# Patient Record
Sex: MALE | Race: WHITE | Employment: OTHER | URBAN - METROPOLITAN AREA
[De-identification: names, ages, dates, MRNs, and addresses within clinical notes are randomized per-mention and may not be internally consistent; named-entity substitution may affect disease eponyms.]

---

## 2017-01-16 DIAGNOSIS — G89.29 CHRONIC MIDLINE LOW BACK PAIN WITHOUT SCIATICA: Primary | ICD-10-CM

## 2017-01-16 DIAGNOSIS — M54.50 CHRONIC MIDLINE LOW BACK PAIN WITHOUT SCIATICA: Primary | ICD-10-CM

## 2017-01-16 RX ORDER — HYDROCODONE BITARTRATE AND ACETAMINOPHEN 5; 325 MG/1; MG/1
1 TABLET ORAL EVERY 6 HOURS PRN
Qty: 30 TABLET | Refills: 0 | Status: SHIPPED | OUTPATIENT
Start: 2017-01-16 | End: 2017-02-15 | Stop reason: ALTCHOICE

## 2017-01-16 NOTE — TELEPHONE ENCOUNTER
Norco 5-325mg      Last Written Prescription Date: 11/10/2016  Last Fill Quantity: 30,  # refills: 0   Last Office Visit with G, [facility] or [facility] prescribing provider: 11/10/2016    Kaleigh Cohen CPhT  [facility] [place]  [phone]

## 2017-01-31 DIAGNOSIS — F98.8 ADD (ATTENTION DEFICIT DISORDER): Primary | ICD-10-CM

## 2017-01-31 NOTE — TELEPHONE ENCOUNTER
adderall  Last Written Prescription Date: 11/10/16  Last Fill Quantity: 90,  # refills: 0   Last Office Visit with [facility], [facility] or [facility] prescribing provider: 11/10/16

## 2017-02-01 RX ORDER — DEXTROAMPHETAMINE SACCHARATE, AMPHETAMINE ASPARTATE, DEXTROAMPHETAMINE SULFATE AND AMPHETAMINE SULFATE 5; 5; 5; 5 MG/1; MG/1; MG/1; MG/1
20 TABLET ORAL DAILY
Qty: 90 TABLET | Refills: 0 | Status: SHIPPED | OUTPATIENT
Start: 2017-02-01 | End: 2017-04-24

## 2017-02-03 ENCOUNTER — TELEPHONE (OUTPATIENT)
Dept: FAMILY MEDICINE | Facility: CLINIC | Age: 52
End: 2017-02-03

## 2017-02-03 DIAGNOSIS — F98.8 ADD (ATTENTION DEFICIT DISORDER): Primary | ICD-10-CM

## 2017-02-03 RX ORDER — DEXTROAMPHETAMINE SACCHARATE, AMPHETAMINE ASPARTATE, DEXTROAMPHETAMINE SULFATE AND AMPHETAMINE SULFATE 5; 5; 5; 5 MG/1; MG/1; MG/1; MG/1
20 TABLET ORAL DAILY
Qty: 90 TABLET | Refills: 0 | Status: CANCELLED | OUTPATIENT
Start: 2017-02-03

## 2017-02-03 NOTE — TELEPHONE ENCOUNTER
Jordi has a current prescription on file in the pharmacy for Adderall. He last filled a 3 month supply for this 11/14/2016...   He should not need until about 2/12...   He is requesting that he can pick this up early, as he states he is going out of town.   Pharmacy will need a verbal ok given; in order for us to be able to fill this early.     Please call the pharmacy and authorize a early fill if you approve. [phone]  Thanks  Jessie Cosme [facility]   [phone]

## 2017-02-14 DIAGNOSIS — G89.29 CHRONIC MIDLINE LOW BACK PAIN WITHOUT SCIATICA: Primary | ICD-10-CM

## 2017-02-14 DIAGNOSIS — G89.29 OTHER CHRONIC PAIN: ICD-10-CM

## 2017-02-14 DIAGNOSIS — M54.50 CHRONIC MIDLINE LOW BACK PAIN WITHOUT SCIATICA: Primary | ICD-10-CM

## 2017-02-14 NOTE — TELEPHONE ENCOUNTER
Reason for Call:  Medication or medication refill:    Do you use a [facility]?  Name of the pharmacy and phone number for the current request:  [facility]- [phone]    Name of the medication requested: OxyContin     Other request: patient was on HYDROcodone-acetaminophen would like to change to oxycontin , please call patient and advise, patient is aware that Dr. Sherman is out of the office today.     Can we leave a detailed message on this number? YES    Phone number patient can be reached at: Home number on file [phone] (home)    Best Time: any    Call taken on 2/14/2017 at 10:25 AM by Kelly Fitzpatrick

## 2017-02-15 RX ORDER — OXYCODONE HYDROCHLORIDE 15 MG/1
15 TABLET, FILM COATED, EXTENDED RELEASE ORAL EVERY 12 HOURS
Qty: 60 TABLET | Refills: 0 | Status: SHIPPED | OUTPATIENT
Start: 2017-02-15 | End: 2017-03-16

## 2017-03-15 DIAGNOSIS — M54.89 MIDLINE BACK PAIN, UNSPECIFIED BACK LOCATION, UNSPECIFIED CHRONICITY: Primary | ICD-10-CM

## 2017-03-15 NOTE — TELEPHONE ENCOUNTER
Patient is requesting refill on Oxycodone instead of Oxycontin.  Doesn't want Oxycontin anymore.  Please advise and Send RX for Oxycodone. Thank you.    Violette Pedersen, Pharmacy Technician  [facility]  [phone]

## 2017-03-16 RX ORDER — OXYCODONE HYDROCHLORIDE 5 MG/1
5 TABLET ORAL EVERY 8 HOURS PRN
Qty: 90 TABLET | Refills: 0 | Status: SHIPPED | OUTPATIENT
Start: 2017-03-16 | End: 2017-05-15

## 2017-03-31 DIAGNOSIS — M62.838 MUSCLE SPASM: ICD-10-CM

## 2017-03-31 RX ORDER — DIAZEPAM 5 MG
TABLET ORAL
Qty: 135 TABLET | Refills: 0 | Status: SHIPPED | OUTPATIENT
Start: 2017-03-31 | End: 2017-06-06

## 2017-03-31 NOTE — TELEPHONE ENCOUNTER
Valium 5mg      Last Written Prescription Date: 11/10/16  Last Fill Quantity: 135,  # refills: 0   Last Office Visit with G, P or [facility] prescribing provider: 11/10/2016    Violette Pedersen, Pharmacy Technician  [facility]  [phone]

## 2017-04-17 DIAGNOSIS — G89.29 CHRONIC MIDLINE LOW BACK PAIN WITHOUT SCIATICA: ICD-10-CM

## 2017-04-17 DIAGNOSIS — M54.50 CHRONIC MIDLINE LOW BACK PAIN WITHOUT SCIATICA: ICD-10-CM

## 2017-04-18 NOTE — TELEPHONE ENCOUNTER
Norco 5-325mg      Last Written Prescription Date: 01/16/2017  Last Fill Quantity: 30,  # refills: 0   Last Office Visit with G, [facility] or [facility] prescribing provider: 11/10/2016    Kaleigh Cohen CPhT  [facility] [place]  [phone]

## 2017-04-20 RX ORDER — HYDROCODONE BITARTRATE AND ACETAMINOPHEN 5; 325 MG/1; MG/1
1 TABLET ORAL EVERY 6 HOURS PRN
Qty: 30 TABLET | Refills: 0 | Status: SHIPPED | OUTPATIENT
Start: 2017-04-20 | End: 2017-08-23 | Stop reason: ALTCHOICE

## 2017-04-24 DIAGNOSIS — F98.8 ADD (ATTENTION DEFICIT DISORDER): ICD-10-CM

## 2017-04-24 RX ORDER — DEXTROAMPHETAMINE SACCHARATE, AMPHETAMINE ASPARTATE, DEXTROAMPHETAMINE SULFATE AND AMPHETAMINE SULFATE 5; 5; 5; 5 MG/1; MG/1; MG/1; MG/1
20 TABLET ORAL DAILY
Qty: 90 TABLET | Refills: 0 | Status: SHIPPED | OUTPATIENT
Start: 2017-04-24 | End: 2017-06-06

## 2017-04-24 NOTE — TELEPHONE ENCOUNTER
adderall      Last Written Prescription Date: 2/1/17  Last Fill Quantity: 90,  # refills: 0   Last Office Visit with [facility], [facility] or [facility] prescribing provider: 11/10/16

## 2017-04-25 ENCOUNTER — TELEPHONE (OUTPATIENT)
Dept: FAMILY MEDICINE | Facility: CLINIC | Age: 52
End: 2017-04-25

## 2017-04-25 NOTE — TELEPHONE ENCOUNTER
Reason for Call:  Other prescription    Detailed comments: Jordi just had his medication filled, he received hydrocodone 325 mg, quantity of 30 tablets, he said he usually gets a quantity of 60 tablets, he is leaving town for work and this quantity will not be enough to last him the time he is gone. Jordi would like to know why the prescription was only filled with a quantity of 30 tablets instead of 60 tablets? Please advise.    Phone Number Patient can be reached at: Home number on file [phone] (home)    Best Time:     Can we leave a detailed message on this number? YES    Call taken on 4/25/2017 at 2:19 PM by Destiny Vincent            Reason for Call:  Other     gerhard comments: Jordi just filled his prescription for hydrocodone, the quantity 325 mg, he usually     Phone Number Patient can be reached at: Home number on file [phone] (home)    Best Time:     Can we leave a detailed message on this number? YES    Call taken on 4/25/2017 at 2:17 PM by Destiny Vincent

## 2017-04-26 NOTE — TELEPHONE ENCOUNTER
He has always gotten 30 tablets and if he wants to change this he will need to make an appointment to discuss this. KB/CMA

## 2017-05-14 ENCOUNTER — TRANSFERRED RECORDS (OUTPATIENT)
Dept: HEALTH INFORMATION MANAGEMENT | Facility: CLINIC | Age: 52
End: 2017-05-14

## 2017-05-15 DIAGNOSIS — M54.89 MIDLINE BACK PAIN, UNSPECIFIED BACK LOCATION, UNSPECIFIED CHRONICITY: ICD-10-CM

## 2017-05-15 RX ORDER — OXYCODONE HYDROCHLORIDE 5 MG/1
5 TABLET ORAL EVERY 8 HOURS PRN
Qty: 90 TABLET | Refills: 0 | Status: SHIPPED | OUTPATIENT
Start: 2017-05-15 | End: 2017-06-06

## 2017-05-15 NOTE — TELEPHONE ENCOUNTER
Pt. Notified and really would like Dr. Sherman to consider prescribing some percocet for him as he was seen in the ED last night and got some and said it really works for him. Per Dr. Sherman we need to check the  before he will consider prescribing anything. KB/CMA

## 2017-05-15 NOTE — TELEPHONE ENCOUNTER
Reason for Call:  Other call back    Detailed comments: patient called stating he needs a call from Dr. Sherman or his nurse regarding his severe back pain. Patient states it's extremely painful, he needs to know what to do. Please advise.     Phone Number Patient can be reached at: Cell number on file:    Telephone Information:   Mobile [phone]       Best Time: anytime    Can we leave a detailed message on this number? YES    Call taken on 5/15/2017 at 9:50 AM by Carmen Wray

## 2017-05-15 NOTE — TELEPHONE ENCOUNTER
Patient needs to be seen in the clinic and he has no showed twice. He has been told several times that he needs to be seen. If he cannot wait until the next available non-acute spot he needs to go to the ED for pain management. KOKO/RENETTA

## 2017-06-06 DIAGNOSIS — M62.838 MUSCLE SPASM: ICD-10-CM

## 2017-06-06 DIAGNOSIS — F98.8 ADD (ATTENTION DEFICIT DISORDER): ICD-10-CM

## 2017-06-06 DIAGNOSIS — M54.89 MIDLINE BACK PAIN, UNSPECIFIED BACK LOCATION, UNSPECIFIED CHRONICITY: ICD-10-CM

## 2017-06-06 NOTE — TELEPHONE ENCOUNTER
Oxycodone 5mg      Last Written Prescription Date: 05/15/2017  Last Fill Quantity: 90,  # refills: 0   Last Office Visit with [facility], [facility] or  Health prescribing provider: 05/10/2017    adderall 20mg      Last Written Prescription Date: 04/24/2017  Last Fill Quantity: 90,  # refills: 0   Last Office Visit with [facility], P or  Health prescribing provider: 05/10/2017    diazepam   5mg   Last Written Prescription Date: 03/21/2017  Last Fill Quantity: 135,  # refills: 0   Last Office Visit with G, P or  Health prescribing provider: 05/10/2017    Thank You,  Jorge L Tomlinson, Pharmacy [facility] [place]

## 2017-06-07 RX ORDER — DIAZEPAM 5 MG
TABLET ORAL
Qty: 135 TABLET | Refills: 0 | Status: SHIPPED | OUTPATIENT
Start: 2017-06-07 | End: 2017-11-24

## 2017-06-07 RX ORDER — OXYCODONE HYDROCHLORIDE 5 MG/1
5 TABLET ORAL EVERY 8 HOURS PRN
Qty: 90 TABLET | Refills: 0 | Status: SHIPPED | OUTPATIENT
Start: 2017-06-07 | End: 2017-08-01

## 2017-06-07 RX ORDER — DEXTROAMPHETAMINE SACCHARATE, AMPHETAMINE ASPARTATE, DEXTROAMPHETAMINE SULFATE AND AMPHETAMINE SULFATE 5; 5; 5; 5 MG/1; MG/1; MG/1; MG/1
20 TABLET ORAL DAILY
Qty: 90 TABLET | Refills: 0 | Status: SHIPPED | OUTPATIENT
Start: 2017-06-07 | End: 2017-07-10

## 2017-07-10 DIAGNOSIS — F98.8 ATTENTION DEFICIT DISORDER, UNSPECIFIED HYPERACTIVITY PRESENCE: ICD-10-CM

## 2017-07-10 RX ORDER — DEXTROAMPHETAMINE SACCHARATE, AMPHETAMINE ASPARTATE, DEXTROAMPHETAMINE SULFATE AND AMPHETAMINE SULFATE 5; 5; 5; 5 MG/1; MG/1; MG/1; MG/1
20 TABLET ORAL DAILY
Qty: 90 TABLET | Refills: 0 | Status: SHIPPED | OUTPATIENT
Start: 2017-07-10 | End: 2017-07-13

## 2017-07-10 NOTE — TELEPHONE ENCOUNTER
Patient is wanting to get his adderall refilled today as he is leaving out of town. Will route to PCP to refill. KB/CMA

## 2017-07-10 NOTE — TELEPHONE ENCOUNTER
He would like to talk to your nurse about one of the prescriptions    Reason for Call:  Other prescription    Detailed comments: he is requesting to speak to your nurse about one the prescriptions he put in for.  He declined to give further information.  He is going out of town and requests a call asap.    Phone Number Patient can be reached at: Home number on file [phone] (home)    Best Time: any    Can we leave a detailed message on this number? YES    Call taken on 7/10/2017 at 1:38 PM by Rome Avendaño

## 2017-07-13 RX ORDER — DEXTROAMPHETAMINE SACCHARATE, AMPHETAMINE ASPARTATE, DEXTROAMPHETAMINE SULFATE AND AMPHETAMINE SULFATE 5; 5; 5; 5 MG/1; MG/1; MG/1; MG/1
20 TABLET ORAL DAILY
Qty: 30 TABLET | Refills: 0 | Status: SHIPPED | OUTPATIENT
Start: 2017-07-13 | End: 2017-08-23

## 2017-07-13 NOTE — TELEPHONE ENCOUNTER
Dr. Sherman will give him 30 tablets today and he was warned that he needs to keep track of his medications from now on.

## 2017-07-13 NOTE — TELEPHONE ENCOUNTER
Patient is calling stating that he needs this filled because he will be going out of town for about 30 days. Patient stated that last week the pharmacy told him he could get it on the 13th, which was a mistake they meant the 19th according to them today. I called the pharmacy and they said they didn't realize that he actually got a 90 day supply in May and that should bring him to August 1st. They said the earliest they can give it to him would be the 27th, Jordi states that this shouldn't be an issue because he pays cash for this, it doesn't go through insurance. Patient states that he is completely out. Please advise

## 2017-08-01 DIAGNOSIS — M54.89 MIDLINE BACK PAIN, UNSPECIFIED BACK LOCATION, UNSPECIFIED CHRONICITY: ICD-10-CM

## 2017-08-02 RX ORDER — OXYCODONE HYDROCHLORIDE 5 MG/1
5 TABLET ORAL EVERY 8 HOURS PRN
Qty: 90 TABLET | Refills: 0 | Status: SHIPPED | OUTPATIENT
Start: 2017-08-02 | End: 2017-11-24

## 2017-08-02 NOTE — TELEPHONE ENCOUNTER
oxycodone      Last Written Prescription Date: 06/07/2017  Last Fill Quantity: 90,  # refills: 0   Last Office Visit with [facility], [facility] or [facility] prescribing provider: 05/10/2017      Thank You,  Jorge L Tomlinson, Pharmacy [facility] [place]

## 2017-08-03 ENCOUNTER — TELEPHONE (OUTPATIENT)
Dept: FAMILY MEDICINE | Facility: CLINIC | Age: 52
End: 2017-08-03

## 2017-08-03 NOTE — TELEPHONE ENCOUNTER
Reason for Call:  Other prescription    Detailed comments: Pt is having a lot of back pain today and is wondering if Dr Sherman would prescribe him something or this.  Wife states that Jordi is laid up today in bed due to pain.  [facility] [place]    Phone Number Patient can be reached at: [phone]    Best Time: any    Can we leave a detailed message on this number? YES    Call taken on 8/3/2017 at 12:07 PM by Princess Cárdenas

## 2017-08-08 ENCOUNTER — TRANSFERRED RECORDS (OUTPATIENT)
Dept: HEALTH INFORMATION MANAGEMENT | Facility: CLINIC | Age: 52
End: 2017-08-08

## 2017-08-14 ENCOUNTER — TRANSFERRED RECORDS (OUTPATIENT)
Dept: HEALTH INFORMATION MANAGEMENT | Facility: CLINIC | Age: 52
End: 2017-08-14

## 2017-08-16 ENCOUNTER — TELEPHONE (OUTPATIENT)
Dept: FAMILY MEDICINE | Facility: CLINIC | Age: 52
End: 2017-08-16

## 2017-08-16 DIAGNOSIS — S32.009A CLOSED FRACTURE OF LUMBAR VERTEBRA (H): ICD-10-CM

## 2017-08-16 DIAGNOSIS — G89.29 OTHER CHRONIC PAIN: Primary | ICD-10-CM

## 2017-08-16 RX ORDER — OXYCODONE HCL 10 MG/1
10 TABLET, FILM COATED, EXTENDED RELEASE ORAL EVERY 12 HOURS
Qty: 15 TABLET | Refills: 0 | Status: CANCELLED | OUTPATIENT
Start: 2017-08-16

## 2017-08-16 NOTE — TELEPHONE ENCOUNTER
Reason for call:  Patient reporting a symptom    Symptom or request: patient calling requesting to speak with Dr. Sherman about an injury. States that he wants to speak with a nurse about it-when asked what this was regarding he said he would talk to a nurse about it. Not sure what this is regarding     Duration (how long have symptoms been present):     Have you been treated for this before? Yes    Additional comments:     Phone Number patient can be reached at:  Home number on file [phone] (home)    Best Time:  any    Can we leave a detailed message on this number:  YES    Call taken on 8/16/2017 at 11:46 AM by Lisbet Bell
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
## 2017-08-16 NOTE — TELEPHONE ENCOUNTER
He will need to be seen in clinic by a  Provider to do PE and review use of meds.  I am not comfortable just prescribing without seeing him as he did not show for last 2 appts with PCP.      Electronically signed by Ashwini Ch PA-C  8/16/2017

## 2017-08-16 NOTE — TELEPHONE ENCOUNTER
Patient is scheduled tomorrow in [place] at 8:00.  Patient understands and agrees to this plan.  Closing this encounter.  Anuja Alvarado RN
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
## 2017-08-17 ENCOUNTER — OFFICE VISIT (OUTPATIENT)
Dept: FAMILY MEDICINE | Facility: OTHER | Age: 52
End: 2017-08-17

## 2017-08-17 VITALS
HEIGHT: 70 IN | RESPIRATION RATE: 12 BRPM | SYSTOLIC BLOOD PRESSURE: 110 MMHG | TEMPERATURE: 98.7 F | BODY MASS INDEX: 27.43 KG/M2 | HEART RATE: 88 BPM | WEIGHT: 191.6 LBS | DIASTOLIC BLOOD PRESSURE: 76 MMHG

## 2017-08-17 DIAGNOSIS — Z12.11 SCREEN FOR COLON CANCER: Primary | ICD-10-CM

## 2017-08-17 PROCEDURE — 99212 OFFICE O/P EST SF 10 MIN: CPT | Performed by: FAMILY MEDICINE

## 2017-08-17 ASSESSMENT — PAIN SCALES - GENERAL: PAINLEVEL: EXTREME PAIN (9)

## 2017-08-17 NOTE — NURSING NOTE
"Chief Complaint   Patient presents with     Recheck Medication     Panel Management       Initial /76 (BP Location: Left arm, Patient Position: Chair, Cuff Size: Adult Regular)  Pulse 88  Temp 98.7  F (37.1  C) (Oral)  Resp 12  Ht 5' 9.88\" (1.775 m)  Wt 191 lb 9.6 oz (86.9 kg)  BMI 27.58 kg/m2 Estimated body mass index is 27.58 kg/(m^2) as calculated from the following:    Height as of this encounter: 5' 9.88\" (1.775 m).    Weight as of this encounter: 191 lb 9.6 oz (86.9 kg).  Medication Reconciliation: complete     Anuja Arana, [facility]  August 17, 2017      "

## 2017-08-17 NOTE — MR AVS SNAPSHOT
"              After Visit Summary   8/17/2017    Jordi Mcdonough    MRN: [id number]           Patient Information     Date Of Birth          1965        Visit Information        Provider Department      8/17/2017 8:00 AM Mary Santiago MD [facility]        Today's Diagnoses     Screen for colon cancer    -  1       Follow-ups after your visit        Your next 10 appointments already scheduled     Aug 23, 2017  3:40 PM CDT   [facility]    [address] [place] [id number] [phone]           Bring a current list of meds and any records pertaining to this visit. For Physicals, please bring immunization records and any forms needing to be filled out. Please arrive 10 minutes early to complete paperwork.              Who to contact     If you have questions or need follow up information about today's clinic visit or your schedule please contact [facility] directly at [phone].  Normal or non-critical lab and imaging results will be communicated to you by tuulhart, letter or phone within 4 business days after the clinic has received the results. If you do not hear from us within 7 days, please contact the clinic through Diasporat or phone. If you have a critical or abnormal lab result, we will notify you by phone as soon as possible.  Submit refill requests through mPortico or call your pharmacy and they will forward the refill request to us. Please allow 3 business days for your refill to be completed.          Additional Information About Your Visit        [employer] Information     mPortico lets you send messages to your doctor, view your test results, renew your prescriptions, schedule appointments and more. To sign up, go to [email or web address] . Click on \"Log in\" on the left side of the screen, which will take you to the Welcome page. Then click on \"Sign up Now\" on the " "right side of the page.     You will be asked to enter the access code listed below, as well as some personal information. Please follow the directions to create your username and password.     Your access code is: [email or web address]  Expires: 11/15/2017  8:43 AM     Your access code will  in 90 days. If you need help or a new code, please call your [facility] or [phone].        Care EveryWhere ID     This is your Care EveryWhere ID. This could be used by other organizations to access your [facility] medical records  [id number]        Your Vitals Were     Pulse Temperature Respirations Height BMI (Body Mass Index)       88 98.7  F (37.1  C) (Oral) 12 5' 9.88\" (1.775 m) 27.58 kg/m2        Blood Pressure from Last 3 Encounters:   17 110/76   11/10/16 122/88   16 (!) 149/96    Weight from Last 3 Encounters:   17 191 lb 9.6 oz (86.9 kg)   11/10/16 188 lb (85.3 kg)   16 178 lb (80.7 kg)              Today, you had the following     No orders found for display       Primary Care Provider Office Phone # Fax #    Collins Sherman [phone]       [facility] [address] [place] [address]        Equal Access to Services     [facility]: Hadii aad ku hadasho Soomaali, waaxda luqadaha, qaybta kaalmada adeegyada, kenya wilson . So [facility] [phone].    ATENCIÓN: Si habla español, tiene a joiner disposición servicios gratuitos de asistencia lingüística. Llame al [phone].    We comply with applicable federal civil rights laws and [place] laws. We do not discriminate on the basis of race, color, national origin, age, disability sex, sexual orientation or gender identity.            Thank you!     Thank you for choosing [facility]  for your care. Our goal is always to provide you with excellent care. Hearing back from our patients is one way we can continue to improve our services. Please take a few minutes " to complete the written survey that you may receive in the mail after your visit with us. Thank you!             Your Updated Medication List - Protect others around you: Learn how to safely use, store and throw away your medicines at www.disposemymeds.org.          This list is accurate as of: 8/17/17  8:43 AM.  Always use your most recent med list.                   Brand Name Dispense Instructions for use Diagnosis    amphetamine-dextroamphetamine 20 MG per tablet    ADDERALL    30 tablet    Take 1 tablet (20 mg) by mouth daily    Attention deficit disorder, unspecified hyperactivity presence       diazepam 5 MG tablet    VALIUM    135 tablet    One and 1/2 tablet at bedtime as needed    Muscle spasm       HYDROcodone-acetaminophen 5-325 MG per tablet    NORCO    30 tablet    Take 1 tablet by mouth every 6 hours as needed    Chronic midline low back pain without sciatica       oxyCODONE 5 MG IR tablet    ROXICODONE    90 tablet    Take 1 tablet (5 mg) by mouth every 8 hours as needed for pain maximum 3 tablet(s) per day. MUST LAST 30 DAYS!    Midline back pain, unspecified back location, unspecified chronicity

## 2017-08-17 NOTE — TELEPHONE ENCOUNTER
ED / Discharge Outreach Protocol    Patient Contact    Attempt # 1    Was call answered?  No.  Unable to leave message.   not set up.  Anuja Alvarado RN

## 2017-08-18 NOTE — TELEPHONE ENCOUNTER
Patient is calling back stating he went to the apt in [place] and this is a doctor that does not know him and did not give him anything for his pain.  He would like to know his options from here.  He is informed that since he No Showed his last 2 appointments, no provider here will be refilling any pain medications.  His choices are to go to the ED for pain treatment, and even then there is no guarantee he will get anything, or he will have to wait until PCP is back in office.  Patient understands and agrees to this plan.  Closing this encounter.  Anuja Alvarado RN

## 2017-08-18 NOTE — TELEPHONE ENCOUNTER
ED / Discharge Outreach Protocol    Patient Contact    Attempt # 2    Was call answered?  No.  Left message on voicemail with information to call me back.  Anuja Alvarado RN

## 2017-08-23 ENCOUNTER — OFFICE VISIT (OUTPATIENT)
Dept: FAMILY MEDICINE | Facility: CLINIC | Age: 52
End: 2017-08-23

## 2017-08-23 VITALS
DIASTOLIC BLOOD PRESSURE: 90 MMHG | RESPIRATION RATE: 22 BRPM | WEIGHT: 195 LBS | SYSTOLIC BLOOD PRESSURE: 160 MMHG | HEART RATE: 106 BPM | OXYGEN SATURATION: 99 % | BODY MASS INDEX: 28.07 KG/M2 | TEMPERATURE: 97.5 F

## 2017-08-23 DIAGNOSIS — G89.29 CHRONIC MIDLINE LOW BACK PAIN WITHOUT SCIATICA: Primary | ICD-10-CM

## 2017-08-23 DIAGNOSIS — F98.8 ATTENTION DEFICIT DISORDER, UNSPECIFIED HYPERACTIVITY PRESENCE: ICD-10-CM

## 2017-08-23 DIAGNOSIS — M54.50 CHRONIC MIDLINE LOW BACK PAIN WITHOUT SCIATICA: Primary | ICD-10-CM

## 2017-08-23 PROCEDURE — 99213 OFFICE O/P EST LOW 20 MIN: CPT | Performed by: FAMILY MEDICINE

## 2017-08-23 RX ORDER — DEXTROAMPHETAMINE SACCHARATE, AMPHETAMINE ASPARTATE, DEXTROAMPHETAMINE SULFATE AND AMPHETAMINE SULFATE 5; 5; 5; 5 MG/1; MG/1; MG/1; MG/1
20 TABLET ORAL DAILY
Qty: 30 TABLET | Refills: 0 | Status: SHIPPED | OUTPATIENT
Start: 2017-08-23 | End: 2017-09-18

## 2017-08-23 RX ORDER — HYDROCODONE BITARTRATE AND ACETAMINOPHEN 10; 325 MG/1; MG/1
1 TABLET ORAL EVERY 8 HOURS PRN
Qty: 30 TABLET | Refills: 0 | Status: SHIPPED | OUTPATIENT
Start: 2017-08-23 | End: 2017-09-07

## 2017-08-23 NOTE — MR AVS SNAPSHOT
"              After Visit Summary   2017    Jordi Mcdonough    MRN: [id number]           Patient Information     Date Of Birth          1965        Visit Information        Provider Department      2017 3:40 PM Collins Sherman MD [facility]         Follow-ups after your visit        Who to contact     If you have questions or need follow up information about today's clinic visit or your schedule please contact [facility] directly at [phone].  Normal or non-critical lab and imaging results will be communicated to you by MyChart, letter or phone within 4 business days after the clinic has received the results. If you do not hear from us within 7 days, please contact the clinic through PureHistoryhart or phone. If you have a critical or abnormal lab result, we will notify you by phone as soon as possible.  Submit refill requests through Ranku or call your pharmacy and they will forward the refill request to us. Please allow 3 business days for your refill to be completed.          Additional Information About Your Visit        [facility] Information     Ranku lets you send messages to your doctor, view your test results, renew your prescriptions, schedule appointments and more. To sign up, go to [email or web address] . Click on \"Log in\" on the left side of the screen, which will take you to the Welcome page. Then click on \"Sign up Now\" on the right side of the page.     You will be asked to enter the access code listed below, as well as some personal information. Please follow the directions to create your username and password.     Your access code is: [email or web address]  Expires: 11/15/2017  8:43 AM     Your access code will  in 90 days. If you need help or a new code, please call your [facility] or [phone].        Care EveryWhere ID     This is your Care EveryWhere ID. This could be used by other organizations to access your [facility] " records  [id number]        Your Vitals Were     Pulse Temperature Respirations Pulse Oximetry BMI (Body Mass Index)       106 97.5  F (36.4  C) (Temporal) 22 99% 28.07 kg/m2        Blood Pressure from Last 3 Encounters:   08/23/17 160/90   08/17/17 110/76   11/10/16 122/88    Weight from Last 3 Encounters:   08/23/17 195 lb (88.5 kg)   08/17/17 191 lb 9.6 oz (86.9 kg)   11/10/16 188 lb (85.3 kg)              Today, you had the following     No orders found for display       Primary Care Provider Office Phone # Fax #    Collins Sherman [phone]       [facility] [address] [place] [address]        Equal Access to Services     [facility] : Hadii shelly [place] hadasho Soomaali, waaxda luqadaha, qaybta kaalmada adeegyada, waxlakeisha agosto haytravis wilson . So [facility] [phone].    ATENCIÓN: Si habla español, tiene a joiner disposición servicios gratuitos de asistencia lingüística. Esha al [phone].    We comply with applicable federal civil rights laws and [place] laws. We do not discriminate on the basis of race, color, national origin, age, disability sex, sexual orientation or gender identity.            Thank you!     Thank you for choosing [facility]  for your care. Our goal is always to provide you with excellent care. Hearing back from our patients is one way we can continue to improve our services. Please take a few minutes to complete the written survey that you may receive in the mail after your visit with us. Thank you!             Your Updated Medication List - Protect others around you: Learn how to safely use, store and throw away your medicines at www.disposemymeds.org.          This list is accurate as of: 8/23/17  4:03 PM.  Always use your most recent med list.                   Brand Name Dispense Instructions for use Diagnosis    amphetamine-dextroamphetamine 20 MG per tablet    ADDERALL    30 tablet    Take 1 tablet (20 mg) by  mouth daily    Attention deficit disorder, unspecified hyperactivity presence       diazepam 5 MG tablet    VALIUM    135 tablet    One and 1/2 tablet at bedtime as needed    Muscle spasm       HYDROcodone-acetaminophen 5-325 MG per tablet    NORCO    30 tablet    Take 1 tablet by mouth every 6 hours as needed    Chronic midline low back pain without sciatica       oxyCODONE 5 MG IR tablet    ROXICODONE    90 tablet    Take 1 tablet (5 mg) by mouth every 8 hours as needed for pain maximum 3 tablet(s) per day. MUST LAST 30 DAYS!    Midline back pain, unspecified back location, unspecified chronicity

## 2017-08-23 NOTE — PROGRESS NOTES
SUBJECTIVE:   Jordi Mcdonough is a 52 year old male who presents to clinic today for the following health issues:      Chief Complaint   Patient presents with     Back Pain     recheck             Problem list and histories reviewed & adjusted, as indicated.  Additional history: as documented        Reviewed and updated as needed this visit by clinical staff  Tobacco  Allergies  Meds       Reviewed and updated as needed this visit by Provider        SUBJECTIVE:  Jordi  is a 52 year old male who presents for:  Discussion of his pain management. His back is really bothering him. 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 He has an appointment with a spine doctor coming up. He would like some Vicodin for pain. He does not want oxycodone at this time. He also takes Adderall for ADD.    Past Medical History:   Diagnosis Date     Anxiety state, unspecified     Anxiety state     Nonorganic sleep disorder, unspecified     Non-org. sleep disorder     Pathologic fracture of distal radius and ulna 1988    right wrist fx     Past Surgical History:   Procedure Laterality Date     ESOPHAGOSCOPY, GASTROSCOPY, DUODENOSCOPY (EGD), COMBINED N/A 9/9/2016    Procedure: COMBINED ESOPHAGOSCOPY, GASTROSCOPY, DUODENOSCOPY (EGD), REMOVE FOREIGN BODY;  Surgeon: Apollo Rudolph MD;  Location:  GI     SURGICAL HISTORY OF -   9/2008    rt arm surgery injury     SURGICAL HISTORY OF -   2003     back surgery     Social History   Substance Use Topics     Smoking status: Never Smoker     Smokeless tobacco: Never Used     Alcohol use Yes      Comment: occasional     Current Outpatient Prescriptions   Medication Sig Dispense Refill     HYDROcodone-acetaminophen (NORCO)  MG per tablet Take 1 tablet by mouth every 8 hours as needed for moderate to severe pain maximum 3 tablet(s) per day 30 tablet 0     amphetamine-dextroamphetamine (ADDERALL) 20 MG per tablet Take 1 tablet (20 mg) by mouth daily 30 tablet 0     oxyCODONE  (ROXICODONE) 5 MG IR tablet Take 1 tablet (5 mg) by mouth every 8 hours as needed for pain maximum 3 tablet(s) per day. MUST LAST 30 DAYS! 90 tablet 0     diazepam (VALIUM) 5 MG tablet One and 1/2 tablet at bedtime as needed 135 tablet 0       REVIEW OF SYSTEMS:   5 point ROS negative except as noted above in HPI, including Gen., Resp, CV, GI &  system review.     OBJECTIVE:  Vitals: /90  Pulse 106  Temp 97.5  F (36.4  C) (Temporal)  Resp 22  Wt 195 lb (88.5 kg)  SpO2 99%  BMI 28.07 kg/m2  BMI= Body mass index is 28.07 kg/(m^2).  He is alert and oriented. Appears in moderate distress. Goes from sitting to standing little bit slowly. Healed surgical scars and a spine limited range of motion. Gait is regular.    ASSESSMENT:  #1 chronic midline low back pain secondary to trauma #2 ADD    PLAN:  Renew his Vicodin and he is going use this cautiously. He is setting up appointments in the future with spine clinic Dr. Solorio. Renewed his Adderall which he doesn't take every day.        Collins Sherman MD  [facility]

## 2017-08-23 NOTE — NURSING NOTE
"Chief Complaint   Patient presents with     Back Pain     recheck       Initial /90  Pulse 106  Temp 97.5  F (36.4  C) (Temporal)  Resp 22  Wt 195 lb (88.5 kg)  SpO2 99%  BMI 28.07 kg/m2 Estimated body mass index is 28.07 kg/(m^2) as calculated from the following:    Height as of 8/17/17: 5' 9.88\" (1.775 m).    Weight as of this encounter: 195 lb (88.5 kg).  Medication Reconciliation: complete    "

## 2017-09-07 DIAGNOSIS — M54.50 CHRONIC MIDLINE LOW BACK PAIN WITHOUT SCIATICA: ICD-10-CM

## 2017-09-07 DIAGNOSIS — G89.29 CHRONIC MIDLINE LOW BACK PAIN WITHOUT SCIATICA: ICD-10-CM

## 2017-09-07 RX ORDER — HYDROCODONE BITARTRATE AND ACETAMINOPHEN 10; 325 MG/1; MG/1
1 TABLET ORAL EVERY 8 HOURS PRN
Qty: 30 TABLET | Refills: 0 | Status: SHIPPED | OUTPATIENT
Start: 2017-09-07 | End: 2017-09-18

## 2017-09-07 NOTE — TELEPHONE ENCOUNTER
Norco 10-325mg      Last Written Prescription Date: 08/23/2017  Last Fill Quantity: 30,  # refills: 0   Last Office Visit with [facility], P or [facility] prescribing provider: 08/23/2017    Violette Pedersen, Pharmacy Technician  [facility]  [phone]

## 2017-09-18 DIAGNOSIS — G89.29 CHRONIC MIDLINE LOW BACK PAIN WITHOUT SCIATICA: ICD-10-CM

## 2017-09-18 DIAGNOSIS — M54.50 CHRONIC MIDLINE LOW BACK PAIN WITHOUT SCIATICA: ICD-10-CM

## 2017-09-18 DIAGNOSIS — F98.8 ATTENTION DEFICIT DISORDER, UNSPECIFIED HYPERACTIVITY PRESENCE: ICD-10-CM

## 2017-09-19 NOTE — TELEPHONE ENCOUNTER
Norco 10-325mg      Last Written Prescription Date: 09/08/2017  Last Fill Quantity: 30,  # refills: 0   Last Office Visit with [facility], [facility] or [facility] prescribing provider: 08/23/2017    Adderall       Last Written Prescription Date: 08/23/2017  Last Fill Quantity: 30,  # refills: 0   Last Office Visit with [facility], [facility] or [facility] prescribing provider: 08/23/2017    Violette Pedersen, Pharmacy Technician  [facility]  [phone]

## 2017-09-20 RX ORDER — DEXTROAMPHETAMINE SACCHARATE, AMPHETAMINE ASPARTATE, DEXTROAMPHETAMINE SULFATE AND AMPHETAMINE SULFATE 5; 5; 5; 5 MG/1; MG/1; MG/1; MG/1
20 TABLET ORAL DAILY
Qty: 30 TABLET | Refills: 0 | Status: SHIPPED | OUTPATIENT
Start: 2017-09-20 | End: 2017-11-16

## 2017-09-20 RX ORDER — HYDROCODONE BITARTRATE AND ACETAMINOPHEN 10; 325 MG/1; MG/1
1 TABLET ORAL EVERY 8 HOURS PRN
Qty: 30 TABLET | Refills: 0 | Status: SHIPPED | OUTPATIENT
Start: 2017-09-20 | End: 2017-11-08

## 2017-10-16 ENCOUNTER — TELEPHONE (OUTPATIENT)
Dept: FAMILY MEDICINE | Facility: CLINIC | Age: 52
End: 2017-10-16

## 2017-10-16 NOTE — TELEPHONE ENCOUNTER
Patient is requesting ritalin 30 mg time released    Thank You,  Jorge L Tomlinson, Pharmacy [facility] [place]

## 2017-11-03 ENCOUNTER — TRANSFERRED RECORDS (OUTPATIENT)
Dept: HEALTH INFORMATION MANAGEMENT | Facility: CLINIC | Age: 52
End: 2017-11-03

## 2017-11-08 ENCOUNTER — TELEPHONE (OUTPATIENT)
Dept: FAMILY MEDICINE | Facility: CLINIC | Age: 52
End: 2017-11-08

## 2017-11-08 DIAGNOSIS — M54.50 CHRONIC MIDLINE LOW BACK PAIN WITHOUT SCIATICA: ICD-10-CM

## 2017-11-08 DIAGNOSIS — G89.29 CHRONIC MIDLINE LOW BACK PAIN WITHOUT SCIATICA: ICD-10-CM

## 2017-11-08 RX ORDER — HYDROCODONE BITARTRATE AND ACETAMINOPHEN 10; 325 MG/1; MG/1
1 TABLET ORAL EVERY 8 HOURS PRN
Qty: 30 TABLET | Refills: 0 | Status: SHIPPED | OUTPATIENT
Start: 2017-11-08 | End: 2017-11-21
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
## 2017-11-08 NOTE — TELEPHONE ENCOUNTER
Reason for Call:  Other call back    Detailed comments: patient was in the ER for his back in [place], [place] and would like to talk to a triage nurse about it would not tell me anything else    Phone Number Patient can be reached at: Home number on file [phone] (home) or Cell number on file:    Telephone Information:   Mobile [phone]       Best Time: any     Can we leave a detailed message on this number? YES    Call taken on 11/8/2017 at 12:07 PM by Kelly Fitzpatrick

## 2017-11-16 DIAGNOSIS — F98.8 ATTENTION DEFICIT DISORDER, UNSPECIFIED HYPERACTIVITY PRESENCE: ICD-10-CM

## 2017-11-21 DIAGNOSIS — M54.50 CHRONIC MIDLINE LOW BACK PAIN WITHOUT SCIATICA: ICD-10-CM

## 2017-11-21 DIAGNOSIS — G89.29 CHRONIC MIDLINE LOW BACK PAIN WITHOUT SCIATICA: ICD-10-CM

## 2017-11-22 RX ORDER — DEXTROAMPHETAMINE SACCHARATE, AMPHETAMINE ASPARTATE, DEXTROAMPHETAMINE SULFATE AND AMPHETAMINE SULFATE 5; 5; 5; 5 MG/1; MG/1; MG/1; MG/1
20 TABLET ORAL DAILY
Qty: 30 TABLET | Refills: 0 | Status: SHIPPED | OUTPATIENT
Start: 2017-11-22 | End: 2017-12-27

## 2017-11-22 NOTE — TELEPHONE ENCOUNTER
Adderall      Last Written Prescription Date:  10/18/17  Last Fill Quantity: 30,   # refills: 0  Future Office visit:       Routing refill request to provider for review/approval because:  Drug not on the [facility], [facility] or [facility] refill protocol or controlled substance    Last office visit was 08/23/17   Please accept or deny in Dr. Sherman's Absence.  He is out of the clinic until 11/28/17

## 2017-11-24 ENCOUNTER — TELEPHONE (OUTPATIENT)
Dept: FAMILY MEDICINE | Facility: CLINIC | Age: 52
End: 2017-11-24

## 2017-11-24 ENCOUNTER — HOSPITAL ENCOUNTER (EMERGENCY)
Facility: CLINIC | Age: 52
Discharge: HOME | End: 2017-11-24
Attending: FAMILY MEDICINE | Admitting: FAMILY MEDICINE
Payer: COMMERCIAL

## 2017-11-24 ENCOUNTER — APPOINTMENT (OUTPATIENT)
Dept: GENERAL RADIOLOGY | Facility: CLINIC | Age: 52
End: 2017-11-24
Attending: FAMILY MEDICINE
Payer: COMMERCIAL

## 2017-11-24 VITALS
HEIGHT: 69 IN | WEIGHT: 185 LBS | RESPIRATION RATE: 16 BRPM | TEMPERATURE: 98.2 F | OXYGEN SATURATION: 99 % | BODY MASS INDEX: 27.4 KG/M2 | DIASTOLIC BLOOD PRESSURE: 111 MMHG | SYSTOLIC BLOOD PRESSURE: 149 MMHG

## 2017-11-24 DIAGNOSIS — I10 ESSENTIAL HYPERTENSION: ICD-10-CM

## 2017-11-24 DIAGNOSIS — S32.040S CLOSED COMPRESSION FRACTURE OF FOURTH LUMBAR VERTEBRA, SEQUELA: ICD-10-CM

## 2017-11-24 LAB
ALBUMIN SERPL-MCNC: 3.9 G/DL (ref 3.4–5)
ALBUMIN UR-MCNC: NEGATIVE MG/DL
ALP SERPL-CCNC: 186 U/L (ref 40–150)
ALT SERPL W P-5'-P-CCNC: 41 U/L (ref 0–70)
ANION GAP SERPL CALCULATED.3IONS-SCNC: 7 MMOL/L (ref 3–14)
APPEARANCE UR: CLEAR
AST SERPL W P-5'-P-CCNC: 23 U/L (ref 0–45)
BASOPHILS # BLD AUTO: 0 10E9/L (ref 0–0.2)
BASOPHILS NFR BLD AUTO: 0.2 %
BILIRUB SERPL-MCNC: 0.2 MG/DL (ref 0.2–1.3)
BILIRUB UR QL STRIP: NEGATIVE
BUN SERPL-MCNC: 30 MG/DL (ref 7–30)
CALCIUM SERPL-MCNC: 9.2 MG/DL (ref 8.5–10.1)
CHLORIDE SERPL-SCNC: 102 MMOL/L (ref 94–109)
CO2 SERPL-SCNC: 29 MMOL/L (ref 20–32)
COLOR UR AUTO: NORMAL
CREAT SERPL-MCNC: 1.1 MG/DL (ref 0.66–1.25)
DIFFERENTIAL METHOD BLD: ABNORMAL
EOSINOPHIL # BLD AUTO: 0 10E9/L (ref 0–0.7)
EOSINOPHIL NFR BLD AUTO: 0 %
ERYTHROCYTE [DISTWIDTH] IN BLOOD BY AUTOMATED COUNT: 12.4 % (ref 10–15)
GFR SERPL CREATININE-BSD FRML MDRD: 70 ML/MIN/1.7M2
GLUCOSE SERPL-MCNC: 105 MG/DL (ref 70–99)
GLUCOSE UR STRIP-MCNC: NEGATIVE MG/DL
HCT VFR BLD AUTO: 46.2 % (ref 40–53)
HGB BLD-MCNC: 15.7 G/DL (ref 13.3–17.7)
HGB UR QL STRIP: NEGATIVE
IMM GRANULOCYTES # BLD: 0 10E9/L (ref 0–0.4)
IMM GRANULOCYTES NFR BLD: 0.2 %
KETONES UR STRIP-MCNC: NEGATIVE MG/DL
LEUKOCYTE ESTERASE UR QL STRIP: NEGATIVE
LYMPHOCYTES # BLD AUTO: 1.3 10E9/L (ref 0.8–5.3)
LYMPHOCYTES NFR BLD AUTO: 10.2 %
MCH RBC QN AUTO: 31.8 PG (ref 26.5–33)
MCHC RBC AUTO-ENTMCNC: 34 G/DL (ref 31.5–36.5)
MCV RBC AUTO: 94 FL (ref 78–100)
MONOCYTES # BLD AUTO: 0.8 10E9/L (ref 0–1.3)
MONOCYTES NFR BLD AUTO: 6.1 %
NEUTROPHILS # BLD AUTO: 10.9 10E9/L (ref 1.6–8.3)
NEUTROPHILS NFR BLD AUTO: 83.3 %
NITRATE UR QL: NEGATIVE
PH UR STRIP: 6 PH (ref 5–7)
PLATELET # BLD AUTO: 252 10E9/L (ref 150–450)
POTASSIUM SERPL-SCNC: 4.2 MMOL/L (ref 3.4–5.3)
PROT SERPL-MCNC: 7.9 G/DL (ref 6.8–8.8)
RBC # BLD AUTO: 4.93 10E12/L (ref 4.4–5.9)
SODIUM SERPL-SCNC: 138 MMOL/L (ref 133–144)
SOURCE: NORMAL
SP GR UR STRIP: 1.01 (ref 1–1.03)
UROBILINOGEN UR STRIP-MCNC: 0 MG/DL (ref 0–2)
WBC # BLD AUTO: 13.1 10E9/L (ref 4–11)

## 2017-11-24 PROCEDURE — 93010 ELECTROCARDIOGRAM REPORT: CPT | Mod: Z6 | Performed by: FAMILY MEDICINE

## 2017-11-24 PROCEDURE — 99285 EMERGENCY DEPT VISIT HI MDM: CPT | Mod: 25 | Performed by: FAMILY MEDICINE

## 2017-11-24 PROCEDURE — 71020 XR CHEST 2 VW: CPT | Mod: TC

## 2017-11-24 PROCEDURE — 81003 URINALYSIS AUTO W/O SCOPE: CPT | Performed by: FAMILY MEDICINE

## 2017-11-24 PROCEDURE — 93005 ELECTROCARDIOGRAM TRACING: CPT | Performed by: FAMILY MEDICINE

## 2017-11-24 PROCEDURE — 25000132 ZZH RX MED GY IP 250 OP 250 PS 637: Performed by: FAMILY MEDICINE

## 2017-11-24 PROCEDURE — 80053 COMPREHEN METABOLIC PANEL: CPT | Performed by: FAMILY MEDICINE

## 2017-11-24 PROCEDURE — 85025 COMPLETE CBC W/AUTO DIFF WBC: CPT | Performed by: FAMILY MEDICINE

## 2017-11-24 RX ORDER — LISINOPRIL/HYDROCHLOROTHIAZIDE 10-12.5 MG
1 TABLET ORAL DAILY
Qty: 30 TABLET | Refills: 0 | Status: SHIPPED | OUTPATIENT
Start: 2017-11-24

## 2017-11-24 RX ORDER — OXYCODONE HYDROCHLORIDE 5 MG/1
15 TABLET ORAL ONCE
Status: COMPLETED | OUTPATIENT
Start: 2017-11-24 | End: 2017-11-24

## 2017-11-24 RX ORDER — HYDROCODONE BITARTRATE AND ACETAMINOPHEN 5; 325 MG/1; MG/1
1-2 TABLET ORAL EVERY 6 HOURS PRN
Qty: 20 TABLET | Refills: 0 | Status: SHIPPED | OUTPATIENT
Start: 2017-11-24 | End: 2017-11-29

## 2017-11-24 RX ORDER — HYDROCODONE BITARTRATE AND ACETAMINOPHEN 10; 325 MG/1; MG/1
1 TABLET ORAL EVERY 8 HOURS PRN
Qty: 30 TABLET | Refills: 0 | Status: SHIPPED | OUTPATIENT
Start: 2017-11-24 | End: 2017-11-24

## 2017-11-24 RX ADMIN — OXYCODONE HYDROCHLORIDE 15 MG: 5 TABLET ORAL at 22:29

## 2017-11-24 NOTE — ED AVS SNAPSHOT
[facility] Emergency Department    [address] [place] [address]    Phone:  [phone]    Fax:  [phone]                                       Jordi Mcdonough   MRN: [id number]    Department:  [facility] Emergency Department   Date of Visit:  11/24/2017           After Visit Summary Signature Page     I have received my discharge instructions, and my questions have been answered. I have discussed any challenges I see with this plan with the nurse or doctor.    ..........................................................................................................................................  Patient/Patient Representative Signature      ..........................................................................................................................................  Patient Representative Print Name and Relationship to Patient    ..................................................               ................................................  Date                                            Time    ..........................................................................................................................................  Reviewed by Signature/Title    ...................................................              ..............................................  Date                                                            Time

## 2017-11-24 NOTE — TELEPHONE ENCOUNTER
Patient called and said he will be out.  Can you please fill in Dr. Sherman's absence?  Thank you!

## 2017-11-24 NOTE — TELEPHONE ENCOUNTER
Reason for Call:  Medication or medication refill:    Do you use a [facility]?  Name of the pharmacy and phone number for the current request:  [facility]- [phone]    Name of the medication requested: norco    Other request: patient following up on this medication request.  He requested it on 11/21 and is aware Dr. Sherman is out of the office.  Is there a covering provider?    Can we leave a detailed message on this number? YES    Phone number patient can be reached at: Home number on file [phone] (home)    Best Time: anytime    Call taken on 11/24/2017 at 9:24 AM by Elena King

## 2017-11-24 NOTE — ED AVS SNAPSHOT
[facility] Emergency Department    [address] [place] [address]    Phone:  [phone]    Fax:  [phone]                                       Jordi Mcdonough   MRN: [id number]    Department:  [facility] Emergency Department   Date of Visit:  11/24/2017           Patient Information     Date Of Birth          1965        Your diagnoses for this visit were:     Essential hypertension     Closed compression fracture of fourth lumbar vertebra, sequela        You were seen by Anthony Husain MD.      Follow-up Information     Follow up with Collins Sherman MD In 2 weeks.    Specialty:  Family Practice    Contact information:    [facility]  [address] [place] [address] [phone]          Discharge Instructions       Thank you for giving us the opportunity to see you. The impression is that you have essential hypertension.    Please see the handout.    Begin lisinopril hydrochlorothiazide 10/12.5 mg once a day.    Monitor your blood pressures several times a week.  Follow up with her primary care provider in the next 2 weeks.    Reserved Vicodin for moderate to severe pain.    After discharge, please closely monitor for any new or worsening symptoms. Return to the Emergency Department at any time if your symptoms worsen.        Discharge References/Attachments     HIGH BLOOD PRESSURE (HYPERTENSION), DISCHARGE INSTRUCTIONS (ENGLISH)      24 Hour Appointment Hotline       To make an appointment at any [facility], call [phone] ([phone]). If you don't have a family doctor or clinic, we will help you find one. [facility] clinics are conveniently located to serve the needs of you and your family.             Review of your medicines      START taking        Dose / Directions Last dose taken    HYDROcodone-acetaminophen 5-325 MG per tablet   Commonly known as:  NORCO   Dose:  1-2 tablet   Quantity:  20 tablet        Take 1-2 tablets  by mouth every 6 hours as needed for moderate to severe pain   Refills:  0        lisinopril-hydrochlorothiazide 10-12.5 MG per tablet   Commonly known as:  ZESTORETIC   Dose:  1 tablet   Quantity:  30 tablet        Take 1 tablet by mouth daily   Refills:  0          Our records show that you are taking the medicines listed below. If these are incorrect, please call your family doctor or clinic.        Dose / Directions Last dose taken    amphetamine-dextroamphetamine 20 MG per tablet   Commonly known as:  ADDERALL   Dose:  20 mg   Quantity:  30 tablet        Take 1 tablet (20 mg) by mouth daily   Refills:  0        CYMBALTA PO   Dose:  30 mg        Take 30 mg by mouth 2 times daily   Refills:  0                Prescriptions were sent or printed at these locations (2 Prescriptions)                   [facility]   [address] [address]    Telephone:  [phone]   Fax:  [phone]   Hours:                  Printed at Department/Unit printer (2 of 2)         lisinopril-hydrochlorothiazide (ZESTORETIC) 10-12.5 MG per tablet               HYDROcodone-acetaminophen (NORCO) 5-325 MG per tablet                Procedures and tests performed during your visit     CBC with platelets differential    Comprehensive metabolic panel    EKG 12-lead, tracing only    UA reflex to Microscopic    XR Chest 2 Views      Orders Needing Specimen Collection     None      Pending Results     No orders found from 11/22/2017 to 11/25/2017.            Pending Culture Results     No orders found from 11/22/2017 to 11/25/2017.            Pending Results Instructions     If you had any lab results that were not finalized at the time of your Discharge, you can call the ED Lab Result RN at [phone]. You will be contacted by this team for any positive Lab results or changes in treatment. The nurses are available 7 days a week from 10A to 6:30P.  You can leave a message 24 hours per day and they  "will return your call.        Thank you for choosing [facility]       Thank you for choosing [facility] for your care. Our goal is always to provide you with excellent care. Hearing back from our patients is one way we can continue to improve our services. Please take a few minutes to complete the written survey that you may receive in the mail after you visit with us. Thank you!        [employer] Information     D2S lets you send messages to your doctor, view your test results, renew your prescriptions, schedule appointments and more. To sign up, go to [email or web address] . Click on \"Log in\" on the left side of the screen, which will take you to the Welcome page. Then click on \"Sign up Now\" on the right side of the page.     You will be asked to enter the access code listed below, as well as some personal information. Please follow the directions to create your username and password.     Your access code is: [email or web address]  Expires: 2018 10:18 PM     Your access code will  in 90 days. If you need help or a new code, please call your [facility] clinic or [phone].        Care EveryWhere ID     This is your Care EveryWhere ID. This could be used by other organizations to access your [facility] medical records  [id number]        Equal Access to Services     CHAVO BRANCH : Romana [place], waaxda luqadaha, qaybta kaalmada adeegyada, kenya leal. So [facility] [phone].    ATENCIÓN: Si habla español, tiene a joiner disposición servicios gratuitos de asistencia lingüística. Llame al [phone].    We comply with applicable federal civil rights laws and [place] laws. We do not discriminate on the basis of race, color, national origin, age, disability, sex, sexual orientation, or gender identity.            After Visit Summary       This is your record. Keep this with you and show to your community pharmacist(s) and doctor(s) at your next visit.                  "

## 2017-11-25 NOTE — ED NOTES
Pt presents with concerns of high blood pressure.  BP at home were 160's/105-110.  Vision changes over the last few weeks, blurred.   Pt describes also being disorientated.
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
## 2017-11-25 NOTE — DISCHARGE INSTRUCTIONS
Thank you for giving us the opportunity to see you. The impression is that you have essential hypertension.    Please see the handout.    Begin lisinopril hydrochlorothiazide 10/12.5 mg once a day.    Monitor your blood pressures several times a week.  Follow up with her primary care provider in the next 2 weeks.    Reserved Vicodin for moderate to severe pain.    After discharge, please closely monitor for any new or worsening symptoms. Return to the Emergency Department at any time if your symptoms worsen.

## 2017-12-27 DIAGNOSIS — F98.8 ATTENTION DEFICIT DISORDER, UNSPECIFIED HYPERACTIVITY PRESENCE: ICD-10-CM

## 2017-12-28 ENCOUNTER — TELEPHONE (OUTPATIENT)
Dept: FAMILY MEDICINE | Facility: CLINIC | Age: 52
End: 2017-12-28

## 2017-12-28 RX ORDER — DEXTROAMPHETAMINE SACCHARATE, AMPHETAMINE ASPARTATE, DEXTROAMPHETAMINE SULFATE AND AMPHETAMINE SULFATE 5; 5; 5; 5 MG/1; MG/1; MG/1; MG/1
20 TABLET ORAL DAILY
Qty: 30 TABLET | Refills: 0 | Status: SHIPPED | OUTPATIENT
Start: 2017-12-28 | End: 2018-01-23

## 2017-12-28 NOTE — LETTER
December 29, 2017      Jordi Mcdonough  [id number] [facility] [address]        Dear Jordi,        Our records show that you are due for a screening colonoscopy. If you would like this procedure done please contact this office at the number above so we can place the order to have this scheduled. Thank you.         Sincerely,            Collins Sherman MD

## 2017-12-28 NOTE — TELEPHONE ENCOUNTER
Panel Management Review      Patient has the following on his problem list: None      Composite cancer screening  Chart review shows that this patient is due/due soon for the following Colonoscopy  Summary:    Patient is due/failing the following:   COLONOSCOPY    Action needed:   Patient needs referral/order: Screening colonoscopy     Type of outreach:    Phone, spoke to patient.  LEFT VM PLEASE OFFER ORDER FOR COLONOSCOPY     Questions for provider review:    None                                                                                                                                    ERS      Chart routed to NONE .

## 2018-01-23 DIAGNOSIS — F98.8 ATTENTION DEFICIT DISORDER, UNSPECIFIED HYPERACTIVITY PRESENCE: ICD-10-CM

## 2018-01-25 RX ORDER — DEXTROAMPHETAMINE SACCHARATE, AMPHETAMINE ASPARTATE, DEXTROAMPHETAMINE SULFATE AND AMPHETAMINE SULFATE 5; 5; 5; 5 MG/1; MG/1; MG/1; MG/1
20 TABLET ORAL DAILY
Qty: 30 TABLET | Refills: 0 | Status: SHIPPED | OUTPATIENT
Start: 2018-01-30

## 2018-01-25 NOTE — TELEPHONE ENCOUNTER
Patient is going out of town. Can we fill this early?    Thank You,  Jorge L Tomlinson, Pharmacy [facility] [place]

## 2018-01-25 NOTE — TELEPHONE ENCOUNTER
Adderall 20 MG       Last Written Prescription Date:  12/28/17  Last Fill Quantity: 30,   # refills: 0  Last Office Visit: 1/3/18  Future Office visit:       Routing refill request to provider for review/approval because:  Drug not on the [facility], [facility] or [facility] refill protocol or controlled substance

## 2018-02-16 ENCOUNTER — DOCUMENTATION ONLY (OUTPATIENT)
Dept: FAMILY MEDICINE | Facility: OTHER | Age: 53
End: 2018-02-16

## 2018-02-16 RX ORDER — DEXTROAMPHETAMINE SACCHARATE, AMPHETAMINE ASPARTATE, DEXTROAMPHETAMINE SULFATE AND AMPHETAMINE SULFATE 5; 5; 5; 5 MG/1; MG/1; MG/1; MG/1
20 TABLET ORAL DAILY
COMMUNITY
Start: 2017-07-13

## 2018-02-16 RX ORDER — PREDNISONE 20 MG/1
40 TABLET ORAL
COMMUNITY
Start: 2017-08-08

## 2018-02-16 RX ORDER — DIAZEPAM 5 MG
7.5 TABLET ORAL
COMMUNITY
Start: 2017-06-07

## 2018-02-16 RX ORDER — OXYCODONE HCL 10 MG/1
10 TABLET, FILM COATED, EXTENDED RELEASE ORAL EVERY 12 HOURS
COMMUNITY
Start: 2017-08-14

## 2018-03-12 ENCOUNTER — DOCUMENTATION ONLY (OUTPATIENT)
Dept: FAMILY MEDICINE | Facility: OTHER | Age: 53
End: 2018-03-12

## 2018-07-24 ENCOUNTER — TRANSFERRED RECORDS (OUTPATIENT)
Dept: HEALTH INFORMATION MANAGEMENT | Facility: CLINIC | Age: 53
End: 2018-07-24

## 2018-07-30 ENCOUNTER — TRANSFERRED RECORDS (OUTPATIENT)
Dept: HEALTH INFORMATION MANAGEMENT | Facility: CLINIC | Age: 53
End: 2018-07-30

## 2018-08-03 ENCOUNTER — TELEPHONE (OUTPATIENT)
Dept: FAMILY MEDICINE | Facility: CLINIC | Age: 53
End: 2018-08-03

## 2018-08-03 NOTE — TELEPHONE ENCOUNTER
Patient called to schedule an appointment for a hospital follow-up or appeared on a report showing that they were recently discharged from the hospital.    Patient was admitted to [facility]   Discharged date: 8/03/18  Reason for hospital admission: Spine surgery   Does patient have future appointment scheduled with provider? No  Date of future appointment:  Patient needs an appointment next Tuesday or Wednesday 8/7 or 8/08    This information will be used to help the care team plan for the patients upcoming visit.  The triage RN may determine that a follow up call is necessary and reach out to the patient via the phone number listed in the chart.     Please route this message on routine priority to the Triage RN pool.

## 2018-08-07 NOTE — TELEPHONE ENCOUNTER
ED / Discharge Outreach Protocol    Patient Contact    Attempt # 1    Was call answered?  No.  Left message on voicemail with information to call me back. He may speak with any of the RNs................DAVID Quesada

## 2018-08-07 NOTE — TELEPHONE ENCOUNTER
"Hospital/TCU/ED for chronic condition Discharge Protocol    \"Hi, my name is Seema Siegel, a registered nurse, and I am calling from [facility].  I am calling to follow up and see how things are going for you after your recent emergency visit/hospital/TCU stay.\"    Tell me how you are doing now that you are home?\" Was discharged 8/3/18, [facility] Spine surgery.       Patient states, \" Can you call me back tomorrow afternoon so we can talk?  I am in a lot of pain now. I don't really want to take the time to talk now.\"      RN told him we will try back tomorrow.  ........Sangita,DAVID    "

## 2018-08-08 NOTE — TELEPHONE ENCOUNTER
ED / Discharge Outreach Protocol    Patient Contact    Attempt # 3    Was call answered?  No.  Unable to leave message....................DAVID Quesada

## 2018-10-28 ENCOUNTER — HOSPITAL ENCOUNTER (EMERGENCY)
Facility: CLINIC | Age: 53
Discharge: HOME | End: 2018-10-28
Attending: EMERGENCY MEDICINE | Admitting: EMERGENCY MEDICINE
Payer: COMMERCIAL

## 2018-10-28 VITALS
TEMPERATURE: 99 F | RESPIRATION RATE: 18 BRPM | SYSTOLIC BLOOD PRESSURE: 150 MMHG | OXYGEN SATURATION: 99 % | DIASTOLIC BLOOD PRESSURE: 104 MMHG

## 2018-10-28 DIAGNOSIS — M54.40 ACUTE BILATERAL LOW BACK PAIN WITH SCIATICA, SCIATICA LATERALITY UNSPECIFIED: ICD-10-CM

## 2018-10-28 PROCEDURE — 99285 EMERGENCY DEPT VISIT HI MDM: CPT | Mod: 25 | Performed by: EMERGENCY MEDICINE

## 2018-10-28 PROCEDURE — 25000128 H RX IP 250 OP 636: Performed by: EMERGENCY MEDICINE

## 2018-10-28 PROCEDURE — 99285 EMERGENCY DEPT VISIT HI MDM: CPT | Mod: Z6 | Performed by: EMERGENCY MEDICINE

## 2018-10-28 PROCEDURE — 96372 THER/PROPH/DIAG INJ SC/IM: CPT | Performed by: EMERGENCY MEDICINE

## 2018-10-28 RX ORDER — OXYCODONE AND ACETAMINOPHEN 5; 325 MG/1; MG/1
1 TABLET ORAL EVERY 4 HOURS PRN
Qty: 12 TABLET | Refills: 0 | Status: SHIPPED | OUTPATIENT
Start: 2018-10-28

## 2018-10-28 RX ADMIN — HYDROMORPHONE HYDROCHLORIDE 1 MG: 1 INJECTION, SOLUTION INTRAMUSCULAR; INTRAVENOUS; SUBCUTANEOUS at 18:25

## 2018-10-28 ASSESSMENT — ENCOUNTER SYMPTOMS
ABDOMINAL PAIN: 1
BACK PAIN: 1

## 2018-10-28 NOTE — ED PROVIDER NOTES
History     Chief Complaint   Patient presents with     Back Pain     The history is provided by the patient.     Jordi Mcdonough is a 53 year old male who presents to the ED complaining of back pain. Patient states he had a L4 Corpectomy and posterior spine fusion L2 to L5 on 7/30/18. Patient is following his rehab program as directed by his spine surgeon as he is not lifting heavy objects and is refraining from bending or twisting at the waist. Patient currently has his back brace applied but has had been taking it off to strengthen his abdominal core muscles. Patient states he has had chronic back pain since the surgery and was told it will take over a year to get better. Patient has been using percocet to manage his pain as he gets 4-5 hours of relief per dose. Patient's pain has become very severe as he has run out of percocet. Patient reports his lower back is tense and painful and is radiating down to bilateral knees. Patient is planning on following up with his PCP. Patient has intermittent abdominal pain.     Problem List:    Patient Active Problem List    Diagnosis Date Noted     Neurological Lyme disease 10/22/2015     Priority: Medium     Lumbar back pain 05/27/2015     Priority: Medium     ADD (attention deficit disorder) 09/30/2014     Priority: Medium     Chronic pain 09/04/2014     Priority: Medium     Difficulty concentrating 02/17/2014     Priority: Medium     Anxiety state      Priority: Medium     Anxiety state  Problem list name updated by automated process. Provider to review       Nonorganic sleep disorder      Priority: Medium     Non-org. sleep disorder  Problem list name updated by automated process. Provider to review       CARDIOVASCULAR SCREENING; LDL GOAL LESS THAN 160 10/31/2010     Priority: Medium     Humeral shaft fracture 10/30/2008     Priority: Medium     Osteoporosis 05/11/2006     Priority: Medium     Problem list name updated by automated process. Provider to review        Closed fracture of lumbar vertebra (H) 09/18/2003     Priority: Medium     Problem list name updated by automated process. Provider to review          Past Medical History:    Past Medical History:   Diagnosis Date     Anxiety state, unspecified      Compression fracture of L4 lumbar vertebra (H) 11/01/2017     Nonorganic sleep disorder, unspecified      Pathologic fracture of distal radius and ulna 1988       Past Surgical History:    Past Surgical History:   Procedure Laterality Date     ESOPHAGOSCOPY, GASTROSCOPY, DUODENOSCOPY (EGD), COMBINED N/A 9/9/2016    Procedure: COMBINED ESOPHAGOSCOPY, GASTROSCOPY, DUODENOSCOPY (EGD), REMOVE FOREIGN BODY;  Surgeon: Apollo Rudolph MD;  Location:  GI     SURGICAL HISTORY OF -   9/2008    rt arm surgery injury     SURGICAL HISTORY OF -   2003     back surgery       Family History:    Family History   Problem Relation Age of Onset     HEART DISEASE Maternal Grandfather      MI     HEART DISEASE Paternal Grandfather      MI       Social History:  Marital Status:   [2]  Social History   Substance Use Topics     Smoking status: Never Smoker     Smokeless tobacco: Never Used     Alcohol use Yes      Comment: occasional        Medications:      oxyCODONE (OXYCONTIN) 10 MG 12 hr tablet   oxyCODONE-acetaminophen (PERCOCET) 5-325 MG per tablet   amphetamine-dextroamphetamine (ADDERALL) 20 MG per tablet   amphetamine-dextroamphetamine (ADDERALL) 20 MG per tablet   diazepam (VALIUM) 5 MG tablet   DULoxetine HCl (CYMBALTA PO)   lisinopril-hydrochlorothiazide (ZESTORETIC) 10-12.5 MG per tablet   predniSONE (DELTASONE) 20 MG tablet         Review of Systems   Gastrointestinal: Positive for abdominal pain (intermittent).   Musculoskeletal: Positive for back pain (low, radiating to bilateral knees).   All other systems reviewed and are negative.      Physical Exam   BP: (!) 150/104  Heart Rate: 91  Temp: 99  F (37.2  C)  Resp: 19  SpO2: 100 %      Physical Exam   Constitutional:  He is oriented to person, place, and time. He appears well-developed and well-nourished. No distress.   HENT:   Head: Normocephalic and atraumatic.   Right Ear: External ear normal.   Left Ear: External ear normal.   Mouth/Throat: Oropharynx is clear and moist. No oropharyngeal exudate.   Eyes: Conjunctivae and EOM are normal. Pupils are equal, round, and reactive to light. Right eye exhibits no discharge. Left eye exhibits no discharge. No scleral icterus.   Neck: Normal range of motion.   Cardiovascular: Normal rate.  Exam reveals no gallop.    No murmur heard.  Pulmonary/Chest: Effort normal. No respiratory distress.   Abdominal: Soft. Bowel sounds are normal. There is tenderness.   Mild diffuse abdominal discomfort with palpation   Musculoskeletal: He exhibits no edema, tenderness or deformity.   There is decreased range of motion at the waist.  No tenderness palpation over the lower lumbar spine   Neurological: He is alert and oriented to person, place, and time. No cranial nerve deficit. Coordination normal.   Skin: Skin is warm. No rash noted. He is not diaphoretic.   Psychiatric: He has a normal mood and affect. His behavior is normal.   Nursing note and vitals reviewed.      ED Course     ED Course     Procedures    No results found for this or any previous visit (from the past 24 hour(s)).    Medications   HYDROmorphone (DILAUDID) injection 1 mg (1 mg Intramuscular Given 10/28/18 1825)       Assessments & Plan (with Medical Decision Making)  Back pain after a back surgery that is chronic.  The patient was given 1 mg of Dilaudid IM with improvement of his symptoms.  I tried to prescribe him Percocet but they were unable to fill it here due to insurance restrictions after him having 60 tablets prescribed of the 16th.  He is welcome to take that prescription and pay for it out of pocket at another pharmacy.  Return to ER precautions were discussed.     I have reviewed the nursing notes.    I have reviewed the  findings, diagnosis, plan and need for follow up with the patient.      Discharge Medication List as of 10/28/2018  6:29 PM      START taking these medications    Details   oxyCODONE-acetaminophen (PERCOCET) 5-325 MG per tablet Take 1 tablet by mouth every 4 hours as needed for pain, Disp-12 tablet, R-0, Local Print             Final diagnoses:   Acute bilateral low back pain with sciatica, sciatica laterality unspecified     This document serves as a record of services personally performed by Hugo Rosas MD. It was created on their behalf by Paul Campbell, a trained medical scribe. The creation of this record is based on the provider's personal observations and the statements of the patient. This document has been checked and approved by the attending provider.    Note: Chart documentation done in part with Dragon Voice Recognition software. Although reviewed after completion, some word and grammatical errors may remain.    10/28/2018   [facility] EMERGENCY DEPARTMENT     Hugo Rosas MD  10/28/18 1956

## 2018-10-28 NOTE — ED AVS SNAPSHOT
[facility] Emergency Department    [address] [place] [address]    Phone:  [phone]    Fax:  [phone]                                       Jordi Mcdonough   MRN: [id number]    Department:  [facility] Emergency Department   Date of Visit:  10/28/2018           Patient Information     Date Of Birth          1965        Your diagnoses for this visit were:     Acute bilateral low back pain with sciatica, sciatica laterality unspecified        You were seen by Hugo Rosas MD.      Follow-up Information     Schedule an appointment as soon as possible for a visit with your doctor.    Why:  ER follow up        Discharge Instructions       If new or worsening symptoms.  Make an appointment with your doctor as soon as possible for long-term management of the pain.  Continue your rehab as recommended by your spine surgeon.  Return to the ER if new or worsening symptoms as discussed    24 Hour Appointment Hotline       To make an appointment at any [facility] clinic, call [phone] ([phone]). If you don't have a family doctor or clinic, we will help you find one. [facility] clinics are conveniently located to serve the needs of you and your family.             Review of your medicines      START taking        Dose / Directions Last dose taken    oxyCODONE-acetaminophen 5-325 MG per tablet   Commonly known as:  PERCOCET   Dose:  1 tablet   Quantity:  12 tablet        Take 1 tablet by mouth every 4 hours as needed for pain   Refills:  0          Our records show that you are taking the medicines listed below. If these are incorrect, please call your family doctor or clinic.        Dose / Directions Last dose taken    * amphetamine-dextroamphetamine 20 MG per tablet   Commonly known as:  ADDERALL   Dose:  20 mg        Take 20 mg by mouth daily   Refills:  0        * amphetamine-dextroamphetamine 20 MG per tablet   Commonly known as:  ADDERALL   Dose:  20 mg    Quantity:  30 tablet        Take 1 tablet (20 mg) by mouth daily   Refills:  0        CYMBALTA PO   Dose:  30 mg        Take 30 mg by mouth 2 times daily   Refills:  0        diazepam 5 MG tablet   Commonly known as:  VALIUM   Dose:  7.5 mg        Take 7.5 mg by mouth nightly as needed   Refills:  0        lisinopril-hydrochlorothiazide 10-12.5 MG per tablet   Commonly known as:  ZESTORETIC   Dose:  1 tablet   Quantity:  30 tablet        Take 1 tablet by mouth daily   Refills:  0        oxyCODONE 10 MG 12 hr tablet   Commonly known as:  OxyCONTIN   Dose:  10 mg        Take 10 mg by mouth every 12 hours   Refills:  0        predniSONE 20 MG tablet   Commonly known as:  DELTASONE   Dose:  40 mg        Take 40 mg by mouth daily with food   Refills:  0        * Notice:  This list has 2 medication(s) that are the same as other medications prescribed for you. Read the directions carefully, and ask your doctor or other care provider to review them with you.            Information about OPIOIDS     PRESCRIPTION OPIOIDS: WHAT YOU NEED TO KNOW   We gave you an opioid (narcotic) pain medicine. It is important to manage your pain, but opioids are not always the best choice. You should first try all the other options your care team gave you. Take this medicine for as short a time (and as few doses) as possible.    Some activities can increase your pain, such as bandage changes or therapy sessions. It may help to take your pain medicine 30 to 60 minutes before these activities. Reduce your stress by getting enough sleep, working on hobbies you enjoy and practicing relaxation or meditation. Talk to your care team about ways to manage your pain beyond prescription opioids.    These medicines have risks:    DO NOT drive when on new or higher doses of pain medicine. These medicines can affect your alertness and reaction times, and you could be arrested for driving under the influence (DUI). If you need to use opioids long-term,  talk to your care team about driving.    DO NOT operate heavy machinery    DO NOT do any other dangerous activities while taking these medicines.    DO NOT drink any alcohol while taking these medicines.     If the opioid prescribed includes acetaminophen, DO NOT take with any other medicines that contain acetaminophen. Read all labels carefully. Look for the word  acetaminophen  or  Tylenol.  Ask your pharmacist if you have questions or are unsure.    You can get addicted to pain medicines, especially if you have a history of addiction (chemical, alcohol or substance dependence). Talk to your care team about ways to reduce this risk.    All opioids tend to cause constipation. Drink plenty of water and eat foods that have a lot of fiber, such as fruits, vegetables, prune juice, apple juice and high-fiber cereal. Take a laxative (Miralax, milk of magnesia, Colace, Senna) if you don t move your bowels at least every other day. Other side effects include upset stomach, sleepiness, dizziness, throwing up, tolerance (needing more of the medicine to have the same effect), physical dependence and slowed breathing.    Store your pills in a secure place, locked if possible. We will not replace any lost or stolen medicine. If you don t finish your medicine, please throw away (dispose) as directed by your pharmacist. The [place] [employer]ution [employer] has more information about safe disposal: https://www.pca.state.[place]/living-green/managing-unwanted-medications        Prescriptions were sent or printed at these locations (1 Prescription)                   [facility] [address]   [address] [address]    Telephone:  [phone]   Fax:  [phone]   Hours:                  Printed at Department/Unit printer (1 of 1)         oxyCODONE-acetaminophen (PERCOCET) 5-325 MG per tablet                Orders Needing Specimen Collection     None      Pending Results  "    No orders found from 10/26/2018 to 10/29/2018.            Pending Culture Results     No orders found from 10/26/2018 to 10/29/2018.            Pending Results Instructions     If you had any lab results that were not finalized at the time of your Discharge, you can call the ED Lab Result RN at [phone]. You will be contacted by this team for any positive Lab results or changes in treatment. The nurses are available 7 days a week from 10A to 6:30P.  You can leave a message 24 hours per day and they will return your call.        Thank you for choosing [facility]       Thank you for choosing [facility] for your care. Our goal is always to provide you with excellent care. Hearing back from our patients is one way we can continue to improve our services. Please take a few minutes to complete the written survey that you may receive in the mail after you visit with us. Thank you!        My[employer] Information     [employer] lets you send messages to your doctor, view your test results, renew your prescriptions, schedule appointments and more. To sign up, go to [email or web address] . Click on \"Log in\" on the left side of the screen, which will take you to the Welcome page. Then click on \"Sign up Now\" on the right side of the page.     You will be asked to enter the access code listed below, as well as some personal information. Please follow the directions to create your username and password.     Your access code is: [email or web address]  Expires: 2019  6:28 PM     Your access code will  in 90 days. If you need help or a new code, please call your [facility] clinic or [phone].        Care EveryWhere ID     This is your Care EveryWhere ID. This could be used by other organizations to access your [facility] medical records  [id number]        Equal Access to Services     CHAVO BRANCH : scott Ruano, kenya meza. So [facility] " [phone].    ATENCIÓN: Si habla español, tiene a joiner disposición servicios gratuitos de asistencia lingüística. Llame al [phone].    We comply with applicable federal civil rights laws and [place] laws. We do not discriminate on the basis of race, color, national origin, age, disability, sex, sexual orientation, or gender identity.            After Visit Summary       This is your record. Keep this with you and show to your community pharmacist(s) and doctor(s) at your next visit.

## 2018-10-28 NOTE — DISCHARGE INSTRUCTIONS
If new or worsening symptoms.  Make an appointment with your doctor as soon as possible for long-term management of the pain.  Continue your rehab as recommended by your spine surgeon.  Return to the ER if new or worsening symptoms as discussed

## 2018-10-28 NOTE — ED AVS SNAPSHOT
[facility] Emergency Department    [address] [place] [address]    Phone:  [phone]    Fax:  [phone]                                       Jordi Mcdonough   MRN: [id number]    Department:  [facility] Emergency Department   Date of Visit:  10/28/2018           After Visit Summary Signature Page     I have received my discharge instructions, and my questions have been answered. I have discussed any challenges I see with this plan with the nurse or doctor.    ..........................................................................................................................................  Patient/Patient Representative Signature      ..........................................................................................................................................  Patient Representative Print Name and Relationship to Patient    ..................................................               ................................................  Date                                   Time    ..........................................................................................................................................  Reviewed by Signature/Title    ...................................................              ..............................................  Date                                               Time          22EPIC Rev 08/18

## 2018-10-28 NOTE — ED TRIAGE NOTES
Pt presents with concerns of back pain.  Pt is in a back brace.  Pt had back surgery July 30.  Pt states pain is from above the brace to the upper thighs of his legs.

## 2019-03-07 ENCOUNTER — DOCUMENTATION ONLY (OUTPATIENT)
Dept: CARE COORDINATION | Facility: CLINIC | Age: 54
End: 2019-03-07

## 2019-03-26 ENCOUNTER — OFFICE VISIT (OUTPATIENT)
Dept: INFECTIOUS DISEASES | Facility: CLINIC | Age: 54
End: 2019-03-26
Attending: INTERNAL MEDICINE
Payer: COMMERCIAL

## 2019-03-26 VITALS
HEART RATE: 97 BPM | BODY MASS INDEX: 27.67 KG/M2 | SYSTOLIC BLOOD PRESSURE: 157 MMHG | WEIGHT: 193.3 LBS | HEIGHT: 70 IN | DIASTOLIC BLOOD PRESSURE: 109 MMHG | TEMPERATURE: 98.2 F

## 2019-03-26 DIAGNOSIS — Z86.19 HX OF LYME DISEASE: Primary | ICD-10-CM

## 2019-03-26 DIAGNOSIS — R29.898 WEAKNESS OF LEFT LOWER EXTREMITY: ICD-10-CM

## 2019-03-26 PROCEDURE — G0463 HOSPITAL OUTPT CLINIC VISIT: HCPCS | Mod: ZF

## 2019-03-26 ASSESSMENT — MIFFLIN-ST. JEOR: SCORE: 1728.05

## 2019-03-26 ASSESSMENT — PAIN SCALES - GENERAL: PAINLEVEL: NO PAIN (0)
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
## 2019-03-26 NOTE — PATIENT INSTRUCTIONS
Preventive Care:    Colorectal Cancer Screening: During our visit today, we discussed that it is recommended you receive colorectal cancer screening. Please call or make an appointment with your primary care provider to discuss this. You may also call the [facility] scheduling line ([phone]) to set up a colonoscopy appointment.

## 2019-03-26 NOTE — NURSING NOTE
"BP (!) 157/109   Pulse 97   Temp 98.2  F (36.8  C) (Oral)   Ht 1.778 m (5' 10\")   Wt 87.7 kg (193 lb 4.8 oz)   BMI 27.74 kg/m    Chief Complaint   Patient presents with     Consult     establish care with Lyme disease, cecelia quigley       "

## 2020-01-27 ENCOUNTER — APPOINTMENT (OUTPATIENT)
Dept: CT IMAGING | Facility: CLINIC | Age: 55
End: 2020-01-27
Attending: EMERGENCY MEDICINE
Payer: COMMERCIAL

## 2020-01-27 ENCOUNTER — HOSPITAL ENCOUNTER (EMERGENCY)
Facility: CLINIC | Age: 55
Discharge: HOME | End: 2020-01-27
Attending: EMERGENCY MEDICINE | Admitting: EMERGENCY MEDICINE
Payer: COMMERCIAL

## 2020-01-27 VITALS
HEART RATE: 82 BPM | SYSTOLIC BLOOD PRESSURE: 160 MMHG | DIASTOLIC BLOOD PRESSURE: 90 MMHG | BODY MASS INDEX: 27.63 KG/M2 | OXYGEN SATURATION: 99 % | HEIGHT: 70 IN | TEMPERATURE: 97.2 F | WEIGHT: 193 LBS | RESPIRATION RATE: 20 BRPM

## 2020-01-27 DIAGNOSIS — M47.812 CERVICAL SPONDYLOSIS: ICD-10-CM

## 2020-01-27 DIAGNOSIS — R79.89 ELEVATED D-DIMER: ICD-10-CM

## 2020-01-27 DIAGNOSIS — I10 ACCELERATED HYPERTENSION: ICD-10-CM

## 2020-01-27 DIAGNOSIS — M54.12 CERVICAL RADICULOPATHY: ICD-10-CM

## 2020-01-27 DIAGNOSIS — Z91.148 NONCOMPLIANCE WITH MEDICATION REGIMEN: ICD-10-CM

## 2020-01-27 LAB
ANION GAP SERPL CALCULATED.3IONS-SCNC: 6 MMOL/L (ref 3–14)
BASOPHILS # BLD AUTO: 0.1 10E9/L (ref 0–0.2)
BASOPHILS NFR BLD AUTO: 0.5 %
BUN SERPL-MCNC: 13 MG/DL (ref 7–30)
CALCIUM SERPL-MCNC: 8.6 MG/DL (ref 8.5–10.1)
CHLORIDE SERPL-SCNC: 102 MMOL/L (ref 94–109)
CO2 SERPL-SCNC: 30 MMOL/L (ref 20–32)
CREAT SERPL-MCNC: 1.01 MG/DL (ref 0.66–1.25)
D DIMER PPP FEU-MCNC: 0.9 UG/ML FEU (ref 0–0.5)
DIFFERENTIAL METHOD BLD: NORMAL
EOSINOPHIL NFR BLD AUTO: 3.6 %
ERYTHROCYTE [DISTWIDTH] IN BLOOD BY AUTOMATED COUNT: 13.3 % (ref 10–15)
GFR SERPL CREATININE-BSD FRML MDRD: 84 ML/MIN/{1.73_M2}
GLUCOSE SERPL-MCNC: 88 MG/DL (ref 70–99)
HCT VFR BLD AUTO: 45.6 % (ref 40–53)
HGB BLD-MCNC: 14.9 G/DL (ref 13.3–17.7)
IMM GRANULOCYTES # BLD: 0 10E9/L (ref 0–0.4)
IMM GRANULOCYTES NFR BLD: 0.4 %
INR PPP: 0.97 (ref 0.86–1.14)
LYMPHOCYTES # BLD AUTO: 1.2 10E9/L (ref 0.8–5.3)
LYMPHOCYTES NFR BLD AUTO: 12.9 %
MCH RBC QN AUTO: 29.9 PG (ref 26.5–33)
MCHC RBC AUTO-ENTMCNC: 32.7 G/DL (ref 31.5–36.5)
MCV RBC AUTO: 92 FL (ref 78–100)
MONOCYTES # BLD AUTO: 1 10E9/L (ref 0–1.3)
MONOCYTES NFR BLD AUTO: 10.6 %
NEUTROPHILS # BLD AUTO: 6.6 10E9/L (ref 1.6–8.3)
NEUTROPHILS NFR BLD AUTO: 72 %
NRBC # BLD AUTO: 0 10*3/UL
NRBC BLD AUTO-RTO: 0 /100
PLATELET # BLD AUTO: 241 10E9/L (ref 150–450)
POTASSIUM SERPL-SCNC: 3.8 MMOL/L (ref 3.4–5.3)
RBC # BLD AUTO: 4.98 10E12/L (ref 4.4–5.9)
SODIUM SERPL-SCNC: 138 MMOL/L (ref 133–144)
TROPONIN I SERPL-MCNC: <0.015 UG/L (ref 0–0.04)
TROPONIN I SERPL-MCNC: <0.015 UG/L (ref 0–0.04)
WBC # BLD AUTO: 9.2 10E9/L (ref 4–11)

## 2020-01-27 PROCEDURE — 71275 CT ANGIOGRAPHY CHEST: CPT

## 2020-01-27 PROCEDURE — 25000128 H RX IP 250 OP 636: Performed by: EMERGENCY MEDICINE

## 2020-01-27 PROCEDURE — 99285 EMERGENCY DEPT VISIT HI MDM: CPT | Mod: 25 | Performed by: EMERGENCY MEDICINE

## 2020-01-27 PROCEDURE — 72125 CT NECK SPINE W/O DYE: CPT

## 2020-01-27 PROCEDURE — 93005 ELECTROCARDIOGRAM TRACING: CPT | Performed by: EMERGENCY MEDICINE

## 2020-01-27 PROCEDURE — 96374 THER/PROPH/DIAG INJ IV PUSH: CPT | Mod: 59 | Performed by: EMERGENCY MEDICINE

## 2020-01-27 PROCEDURE — 96375 TX/PRO/DX INJ NEW DRUG ADDON: CPT | Performed by: EMERGENCY MEDICINE

## 2020-01-27 PROCEDURE — 93010 ELECTROCARDIOGRAM REPORT: CPT | Mod: Z6 | Performed by: EMERGENCY MEDICINE

## 2020-01-27 PROCEDURE — 85610 PROTHROMBIN TIME: CPT | Performed by: EMERGENCY MEDICINE

## 2020-01-27 PROCEDURE — 80048 BASIC METABOLIC PNL TOTAL CA: CPT | Performed by: EMERGENCY MEDICINE

## 2020-01-27 PROCEDURE — 25000125 ZZHC RX 250: Performed by: EMERGENCY MEDICINE

## 2020-01-27 PROCEDURE — 96376 TX/PRO/DX INJ SAME DRUG ADON: CPT | Mod: 59 | Performed by: EMERGENCY MEDICINE

## 2020-01-27 PROCEDURE — 85025 COMPLETE CBC W/AUTO DIFF WBC: CPT | Performed by: EMERGENCY MEDICINE

## 2020-01-27 PROCEDURE — 85379 FIBRIN DEGRADATION QUANT: CPT | Performed by: EMERGENCY MEDICINE

## 2020-01-27 PROCEDURE — 84484 ASSAY OF TROPONIN QUANT: CPT | Mod: 91 | Performed by: EMERGENCY MEDICINE

## 2020-01-27 RX ORDER — METHYLPREDNISOLONE 4 MG
TABLET, DOSE PACK ORAL
Qty: 21 TABLET | Refills: 0 | Status: SHIPPED | OUTPATIENT
Start: 2020-01-27

## 2020-01-27 RX ORDER — ONDANSETRON 2 MG/ML
4 INJECTION INTRAMUSCULAR; INTRAVENOUS EVERY 30 MIN PRN
Status: DISCONTINUED | OUTPATIENT
Start: 2020-01-27 | End: 2020-01-27 | Stop reason: HOSPADM

## 2020-01-27 RX ORDER — DEXAMETHASONE SODIUM PHOSPHATE 10 MG/ML
10 INJECTION, SOLUTION INTRAMUSCULAR; INTRAVENOUS ONCE
Status: COMPLETED | OUTPATIENT
Start: 2020-01-27 | End: 2020-01-27

## 2020-01-27 RX ORDER — IOPAMIDOL 755 MG/ML
500 INJECTION, SOLUTION INTRAVASCULAR ONCE
Status: COMPLETED | OUTPATIENT
Start: 2020-01-27 | End: 2020-01-27

## 2020-01-27 RX ORDER — OXYCODONE AND ACETAMINOPHEN 5; 325 MG/1; MG/1
1-2 TABLET ORAL EVERY 4 HOURS PRN
Qty: 12 TABLET | Refills: 0 | Status: SHIPPED | OUTPATIENT
Start: 2020-01-27

## 2020-01-27 RX ORDER — MORPHINE SULFATE 4 MG/ML
4 INJECTION, SOLUTION INTRAMUSCULAR; INTRAVENOUS
Status: DISCONTINUED | OUTPATIENT
Start: 2020-01-27 | End: 2020-01-27 | Stop reason: HOSPADM

## 2020-01-27 RX ADMIN — MORPHINE SULFATE 4 MG: 4 INJECTION, SOLUTION INTRAMUSCULAR; INTRAVENOUS at 06:16

## 2020-01-27 RX ADMIN — SODIUM CHLORIDE 70 ML: 9 INJECTION, SOLUTION INTRAVENOUS at 03:58

## 2020-01-27 RX ADMIN — MORPHINE SULFATE 4 MG: 4 INJECTION, SOLUTION INTRAMUSCULAR; INTRAVENOUS at 03:42

## 2020-01-27 RX ADMIN — ONDANSETRON 4 MG: 2 INJECTION INTRAMUSCULAR; INTRAVENOUS at 03:41

## 2020-01-27 RX ADMIN — IOPAMIDOL 80 ML: 755 INJECTION, SOLUTION INTRAVENOUS at 03:58

## 2020-01-27 RX ADMIN — DEXAMETHASONE SODIUM PHOSPHATE 10 MG: 10 INJECTION, SOLUTION INTRAMUSCULAR; INTRAVENOUS at 03:41

## 2020-01-27 ASSESSMENT — ENCOUNTER SYMPTOMS
COUGH: 0
SHORTNESS OF BREATH: 1
NECK PAIN: 1
FATIGUE: 0
WEAKNESS: 0
NAUSEA: 1
HEADACHES: 0
ABDOMINAL PAIN: 0
PALPITATIONS: 0
VOMITING: 0
BACK PAIN: 1
FEVER: 0
NECK STIFFNESS: 0
DIZZINESS: 0
JOINT SWELLING: 0

## 2020-01-27 ASSESSMENT — MIFFLIN-ST. JEOR: SCORE: 1721.69

## 2020-01-27 NOTE — DISCHARGE INSTRUCTIONS
You may take Percocet 1 to 2 tablets every 4-6 hours as needed for pain.  For more mild or moderate pain, I would recommend taking Tylenol or ibuprofen per bottle instructions.    You were given a dose of steroids in the ER today.  Tomorrow, start taking the Medrol Dosepak per package instructions.  This should help to ease some of the nerve pain that you are experiencing in your neck and arm    You may call and follow-up with the neurosurgeon and spine care clinic, contact information as above.  They do come to the [place] specialty [facility], and [facility].  Allie Fisher will be in [place] on 1/30/2020, and Dr. Martinez will be in the [facility] on 2/13/2020.  Call the specialty center to schedule an appointment if desired.    Cardiac work-up today was reassuring.  Follow-up with the cardiologist if they had previously instructed you to do so, otherwise follow-up with your regular doctor if you continue to have issues with chest pain    Resume your blood pressure medications as previously prescribed.  Is important to take these medications daily to keep your blood pressure under control.  If your blood pressure is too high and remains too high, you are at risk of stroke.  Continue to monitor your blood pressure at home and keep a log to discuss with your primary provider.  This may be helpful if they need to make any adjustments in your medication.    Return to the ER if you have any new or concerning symptoms

## 2020-01-27 NOTE — ED TRIAGE NOTES
Pt presents with severe chest pain and left neck pain. Pt had 18 hour flight from [place]. Pt short of breath.

## 2020-01-27 NOTE — ED AVS SNAPSHOT
[facility] Emergency Department  [address] [place] [address]  Phone:  [phone]  Fax:  [phone]                                    Jordi Mcdonough   MRN: [id number]    Department:  [facility] Emergency Department   Date of Visit:  1/27/2020           After Visit Summary Signature Page    I have received my discharge instructions, and my questions have been answered. I have discussed any challenges I see with this plan with the nurse or doctor.    ..........................................................................................................................................  Patient/Patient Representative Signature      ..........................................................................................................................................  Patient Representative Print Name and Relationship to Patient    ..................................................               ................................................  Date                                   Time    ..........................................................................................................................................  Reviewed by Signature/Title    ...................................................              ..............................................  Date                                               Time          22EPIC Rev 08/18

## 2020-01-27 NOTE — ED PROVIDER NOTES
"  History     Chief Complaint   Patient presents with     Chest Pain     HPI  Jordi Mcdonough is a 55 year old male who presents with neck pain, left arm pain, chest pain, and shortness of breath.  Patient states that he has been having neck pain and left arm pain for the last 3 weeks.  It feels like \"a TENS unit is firing and having shocks of pain\".  There is nothing that seems to make this pain better or worse, it is constant.  He does not recall any recent injury to his neck or arm, but he is a  and he has had several accidents where \"my head was almost taken off my body\".  He recently traveled back from [place], and has been trying to adjust his body to the time change.  He was awake this evening when he felt that he was having a spasm of pain on the left side of his neck and generally felt unwell.  He then checked his blood pressure at home, and noted his blood pressure was 200s/100s.  He does have a history of hypertension, is uncontrolled, and was prescribed medication for this previously, but has not been taking it for the last several months that he has been in [place].  He says that prior to today, his blood pressure would run in the systolic 160s to 170s.  He denies any headache, no blurry vision, no slurred speech, no syncope.  He also denies any lower extremity pain or swelling after his recent travel.  He called the nurse line with his concerns, and they state with his symptoms and his elevated blood pressure he should report to the ER immediately for evaluation.    States that he had a stress test 3 months ago, no history of heart attack, no history of cardiac catheterization.  After the stress test he was told that he had a blockage in an artery, but insists that he not undergo cardiac catheterization and has never had a stent.  After the stress test, he was prescribed medication to control his blood pressure, lisinopril-hydrochlorothiazide 10-12.5 mg and amlodipine.  He does " not take these medications consistently, and has not taken them for the past 2 to 3 months since he left the medication at home when he went to [place].    Allergies:  Allergies   Allergen Reactions     No Known Drug Allergies        Problem List:    Patient Active Problem List    Diagnosis Date Noted     Neurological Lyme disease 10/22/2015     Priority: Medium     Lumbar back pain 05/27/2015     Priority: Medium     ADD (attention deficit disorder) 09/30/2014     Priority: Medium     Chronic pain 09/04/2014     Priority: Medium     Difficulty concentrating 02/17/2014     Priority: Medium     Anxiety state      Priority: Medium     Anxiety state  Problem list name updated by automated process. Provider to review       Nonorganic sleep disorder      Priority: Medium     Non-org. sleep disorder  Problem list name updated by automated process. Provider to review       CARDIOVASCULAR SCREENING; LDL GOAL LESS THAN 160 10/31/2010     Priority: Medium     Humeral shaft fracture 10/30/2008     Priority: Medium     Osteoporosis 05/11/2006     Priority: Medium     Problem list name updated by automated process. Provider to review       Closed fracture of lumbar vertebra (H) 09/18/2003     Priority: Medium     Problem list name updated by automated process. Provider to review          Past Medical History:    Past Medical History:   Diagnosis Date     Anxiety state, unspecified      Compression fracture of L4 lumbar vertebra (H) 11/01/2017     Nonorganic sleep disorder, unspecified      Pathologic fracture of distal radius and ulna 1988       Past Surgical History:    Past Surgical History:   Procedure Laterality Date     ESOPHAGOSCOPY, GASTROSCOPY, DUODENOSCOPY (EGD), COMBINED N/A 9/9/2016    Procedure: COMBINED ESOPHAGOSCOPY, GASTROSCOPY, DUODENOSCOPY (EGD), REMOVE FOREIGN BODY;  Surgeon: Apollo Rudolph MD;  Location:  GI     SURGICAL HISTORY OF -   9/2008    rt arm surgery injury     SURGICAL HISTORY OF -   2003   "   back surgery       Family History:    Family History   Problem Relation Age of Onset     Heart Disease Maternal Grandfather         MI     Heart Disease Paternal Grandfather         MI       Social History:  Marital Status:   [2]  Social History     Tobacco Use     Smoking status: Never Smoker     Smokeless tobacco: Never Used   Substance Use Topics     Alcohol use: Yes     Comment: occasional     Drug use: No        Medications:    amphetamine-dextroamphetamine (ADDERALL) 20 MG per tablet  amphetamine-dextroamphetamine (ADDERALL) 20 MG per tablet  diazepam (VALIUM) 5 MG tablet  DULoxetine HCl (CYMBALTA PO)  lisinopril-hydrochlorothiazide (ZESTORETIC) 10-12.5 MG per tablet  oxyCODONE (OXYCONTIN) 10 MG 12 hr tablet  oxyCODONE-acetaminophen (PERCOCET) 5-325 MG per tablet  predniSONE (DELTASONE) 20 MG tablet          Review of Systems   Constitutional: Negative for fatigue and fever.   Respiratory: Positive for shortness of breath. Negative for cough.    Cardiovascular: Positive for chest pain. Negative for palpitations and leg swelling.   Gastrointestinal: Positive for nausea. Negative for abdominal pain and vomiting.   Musculoskeletal: Positive for back pain and neck pain. Negative for joint swelling and neck stiffness.   Neurological: Negative for dizziness, syncope, weakness and headaches.   All other systems reviewed and are negative.      Physical Exam   BP: (!) 185/112  Pulse: 100  Temp: 99  F (37.2  C)  Resp: 16  Height: 177.8 cm (5' 10\")  Weight: 87.5 kg (193 lb)  SpO2: 99 %      Physical Exam  Vitals signs and nursing note reviewed.   Constitutional:       Appearance: He is not diaphoretic.   HENT:      Head: Normocephalic and atraumatic.   Eyes:      Extraocular Movements: Extraocular movements intact.      Pupils: Pupils are equal, round, and reactive to light.   Neck:      Musculoskeletal: Normal range of motion and neck supple.   Cardiovascular:      Rate and Rhythm: Normal rate and regular " rhythm.      Heart sounds: Normal heart sounds.   Pulmonary:      Effort: Pulmonary effort is normal.      Breath sounds: Normal breath sounds. No wheezing or rhonchi.   Chest:      Chest wall: No tenderness or crepitus.   Abdominal:      General: Bowel sounds are normal.      Palpations: Abdomen is soft.   Musculoskeletal: Normal range of motion.      Right lower leg: He exhibits no tenderness. No edema.      Left lower leg: He exhibits no tenderness. No edema.   Lymphadenopathy:      Cervical: No cervical adenopathy.   Skin:     General: Skin is warm and dry.      Capillary Refill: Capillary refill takes less than 2 seconds.   Neurological:      General: No focal deficit present.      Mental Status: He is alert.   Psychiatric:         Mood and Affect: Mood is anxious.         ED Course        Procedures               EKG Interpretation:      Interpreted by Judy Campbell DO  Time reviewed: 0254  Symptoms at time of EKG: chest pain, neck pain, arm pain   Rhythm: normal sinus   Rate: normal  Axis: normal  Ectopy: none  Conduction: normal  ST Segments/ T Waves: No ST-T wave changes  Q Waves: none  Comparison to prior: Unchanged    Clinical Impression: normal EKG          Critical Care time:  none               Results for orders placed or performed during the hospital encounter of 01/27/20 (from the past 24 hour(s))   Troponin I   Result Value Ref Range    Troponin I ES <0.015 0.000 - 0.045 ug/L   CBC with platelets differential   Result Value Ref Range    WBC 9.2 4.0 - 11.0 10e9/L    RBC Count 4.98 4.4 - 5.9 10e12/L    Hemoglobin 14.9 13.3 - 17.7 g/dL    Hematocrit 45.6 40.0 - 53.0 %    MCV 92 78 - 100 fl    MCH 29.9 26.5 - 33.0 pg    MCHC 32.7 31.5 - 36.5 g/dL    RDW 13.3 10.0 - 15.0 %    Platelet Count 241 150 - 450 10e9/L    Diff Method Automated Method     % Neutrophils 72.0 %    % Lymphocytes 12.9 %    % Monocytes 10.6 %    % Eosinophils 3.6 %    % Basophils 0.5 %    % Immature Granulocytes 0.4 %     Nucleated RBCs 0 0 /100    Absolute Neutrophil 6.6 1.6 - 8.3 10e9/L    Absolute Lymphocytes 1.2 0.8 - 5.3 10e9/L    Absolute Monocytes 1.0 0.0 - 1.3 10e9/L    Absolute Basophils 0.1 0.0 - 0.2 10e9/L    Abs Immature Granulocytes 0.0 0 - 0.4 10e9/L    Absolute Nucleated RBC 0.0    D dimer quantitative   Result Value Ref Range    D Dimer 0.9 (H) 0.0 - 0.50 ug/ml FEU   INR   Result Value Ref Range    INR 0.97 0.86 - 1.14   Basic metabolic panel   Result Value Ref Range    Sodium 138 133 - 144 mmol/L    Potassium 3.8 3.4 - 5.3 mmol/L    Chloride 102 94 - 109 mmol/L    Carbon Dioxide 30 20 - 32 mmol/L    Anion Gap 6 3 - 14 mmol/L    Glucose 88 70 - 99 mg/dL    Urea Nitrogen 13 7 - 30 mg/dL    Creatinine 1.01 0.66 - 1.25 mg/dL    GFR Estimate 84 >60 mL/min/[1.73_m2]    GFR Estimate If Black >90 >60 mL/min/[1.73_m2]    Calcium 8.6 8.5 - 10.1 mg/dL   Cervical spine CT w/o contrast    Narrative    EXAM: CT CERVICAL SPINE W/O CONTRAST  LOCATION: [facility]  DATE/TIME: 1/27/2020 3:42 AM    INDICATION: Neck pain. Trauma.  COMPARISON: None.  TECHNIQUE: Routine without IV contrast. Multiplanar reformats. Dose reduction techniques were used.    FINDINGS:  VERTEBRA: T2 vertebral body superior endplate demonstrates a broad base mild concavity/compression deformity, age-indeterminate.    Remaining vertebral body heights within normal limits. Normal alignment.     Otherwise, no acute fracture or posttraumatic subluxation. No prevertebral soft tissue swelling.    CANAL/FORAMINA: Multilevel spondylosis results in various levels and degrees of central canal stenosis and neuroforaminal stenosis. No critical central canal stenosis.     PARASPINAL: Lung apices demonstrate mild septal thickening could just component of mild-moderate pulmonary venous hypertension.    Vascular calcifications.      Impression    IMPRESSION:  1.  T2 vertebral body superior endplate demonstrates a broad base mild concavity/compression deformity,  age-indeterminate.  2.  Otherwise, no acute fracture or posttraumatic subluxation.  3.  Degenerative changes described above.  4.  Additional findings above.           CT Chest Pulmonary Embolism w Contrast    Narrative    EXAM: CT CHEST WITH CONTRAST - PULMONARY EMBOLISM PROTOCOL   LOCATION: [facility]  DATE/TIME: 1/27/2020 3:39 AM    INDICATION: Intermediate probability of pulmonary embolus. Positive d-dimer.  COMPARISON: None.    TECHNIQUE: CT angiogram chest during pulmonary arterial phase injection IV contrast. Dose reduction techniques were used.   CONTRAST: 80 mL-Isovue 370    FINDINGS:    ANGIOGRAM CHEST: No visualized pulmonary embolus. The aorta is normal in caliber without dissection.    LUNGS AND PLEURA: Unremarkable.    MEDIASTINUM/AXILLAE: Unremarkable.    UPPER ABDOMEN: Partial visualization of fusion hardware in the upper lumbar spine.      Impression    IMPRESSION:  1. No visualized pulmonary embolus.  2. No evidence of active pulmonary disease.     Troponin I   Result Value Ref Range    Troponin I ES <0.015 0.000 - 0.045 ug/L       Medications   ondansetron (ZOFRAN) injection 4 mg (4 mg Intravenous Given 1/27/20 0341)   morphine (PF) injection 4 mg (4 mg Intravenous Given 1/27/20 0342)   dexamethasone PF (DECADRON) injection 10 mg (10 mg Intravenous Given 1/27/20 0341)   sodium chloride (PF) 0.9% PF flush 3 mL (3 mLs Intracatheter Given 1/27/20 0358)   iopamidol (ISOVUE-370) solution 500 mL (80 mLs Intravenous Given 1/27/20 0358)   sodium chloride 0.9 % bag 100mL for CT scan flush use (70 mLs Intravenous Given 1/27/20 0358)       Assessments & Plan (with Medical Decision Making)  Virgil is a 55-year-old male with past medical history of hypertension and ADHD who presents with left-sided neck pain, left arm pain, left-sided chest pain, and shortness of breath.  States that the neck and arm pain has been ongoing for the last few weeks.  It is a sharp, shooting, zinging, shock type pain.   Tonight he had an exacerbation of the neck pain and also felt that he had pain on the left side of his chest.  Blood pressure was elevated with systolic reading in 200s.  Called the nurse line and was advised to go to the ED for further evaluation.  See above history and physical for further details  Patient is very anxious and speaking very quickly.  Is not diaphoretic, has a normal heart rate.  Normal range of motion of his left arm, normal range of motion of his neck.  No point tenderness of the cervical vertebrae.  EKG was obtained and shows normal sinus rhythm without ectopy or ST changes.  Peripheral IV was placed to administer parenteral medications.  Labs drawn.  Patient does have an elevated d-dimer, but initial troponin is undetectable and remainder of labs are unremarkable.  I suspect that the patient's symptoms are due to cervical radiculopathy, likely has degenerative changes of his cervical spine due to history of neck injuries.  I think that he also has uncontrolled hypertension, as he has admitted Robert been noncompliant with his blood pressure medication.  Finally I believe that he is very anxious over all this, and also has a history of ADHD, he is likely not taking his Adderall as prescribed since he has admitted to medication noncompliance.  Did have a recent cardiac stress test which she states was mostly normal.  Will obtain CT scan of the cervical spine to evaluate for foraminal stenosis.  We will also obtain a CTA chest to rule out PE due to elevated d-dimer and recent prolonged travel via airplane.  On reevaluation 0430 patient states that the pain is improved after IV morphine and Decadron.  CT results are pending, but was informed of the lab results.  He is already requesting medication for pain and something for sleep and would like to be discharged soon.  He is willing to wait for CT results.  CT results as above.  Degenerative changes to the neck, but no critical stenosis.  CTA chest is  negative for pulmonary embolism.  A second troponin was obtained 3 hours after the initial blood draw and remains undetectable.  Chest pain is unlikely to be a cardiac cause, coupled with the recent stress test, I believe the patient is safe for discharge.  Discussed all results with patient and his wife at the bedside.  He is feeling somewhat better.  Given instructions for medication, side effects, and given information for follow-up with neurosurgery and spine clinic as desired.  Instructed him that he may need further evaluation, including but not limited to an MRI, as well as need to discuss options for treating his symptoms.  He understands and agrees, is eager for discharge at this time.  Prescriptions filled, patient is discharged from the ED in stable condition, in no acute distress.  His blood pressure has improved, and I instructed him to resume his blood pressure medication as previously prescribed.     I have reviewed the nursing notes.    I have reviewed the findings, diagnosis, plan and need for follow up with the patient.       New Prescriptions    No medications on file       Final diagnoses:   Cervical radiculopathy   Accelerated hypertension   Noncompliance with medication regimen   Elevated d-dimer   Cervical spondylosis       1/27/2020   [facility] EMERGENCY DEPARTMENT     Judy Campbell DO  01/27/20 0650